# Patient Record
Sex: MALE | Race: BLACK OR AFRICAN AMERICAN | Employment: UNEMPLOYED | URBAN - METROPOLITAN AREA
[De-identification: names, ages, dates, MRNs, and addresses within clinical notes are randomized per-mention and may not be internally consistent; named-entity substitution may affect disease eponyms.]

---

## 2017-12-04 ENCOUNTER — OFFICE VISIT (OUTPATIENT)
Dept: CARDIOLOGY CLINIC | Age: 62
End: 2017-12-04

## 2017-12-04 VITALS
RESPIRATION RATE: 16 BRPM | SYSTOLIC BLOOD PRESSURE: 110 MMHG | DIASTOLIC BLOOD PRESSURE: 68 MMHG | HEIGHT: 66 IN | HEART RATE: 84 BPM | BODY MASS INDEX: 43.71 KG/M2 | OXYGEN SATURATION: 97 % | WEIGHT: 272 LBS

## 2017-12-04 DIAGNOSIS — I10 ESSENTIAL HYPERTENSION: ICD-10-CM

## 2017-12-04 DIAGNOSIS — I25.118 CORONARY ARTERY DISEASE INVOLVING NATIVE HEART WITH OTHER FORM OF ANGINA PECTORIS, UNSPECIFIED VESSEL OR LESION TYPE (HCC): ICD-10-CM

## 2017-12-04 DIAGNOSIS — R07.9 CHEST PAIN, UNSPECIFIED TYPE: Primary | ICD-10-CM

## 2017-12-04 RX ORDER — METOPROLOL SUCCINATE 25 MG/1
25 TABLET, EXTENDED RELEASE ORAL
Qty: 30 TAB | Refills: 1 | Status: SHIPPED | OUTPATIENT
Start: 2017-12-04 | End: 2020-12-23 | Stop reason: DRUGHIGH

## 2017-12-04 RX ORDER — GLIPIZIDE 5 MG/1
TABLET ORAL
Refills: 0 | COMMUNITY
Start: 2017-10-29 | End: 2018-06-18

## 2017-12-04 RX ORDER — ALLOPURINOL 100 MG/1
TABLET ORAL
Refills: 0 | COMMUNITY
Start: 2017-09-13

## 2017-12-04 RX ORDER — NITROGLYCERIN 0.4 MG/1
0.4 TABLET SUBLINGUAL
Qty: 1 BOTTLE | Refills: 1 | Status: SHIPPED | OUTPATIENT
Start: 2017-12-04

## 2017-12-04 NOTE — PROGRESS NOTES
Faisal Sy DNP, ANP-BC  Subjective/HPI:     Lulú Arroyo is a 58 y.o. male is here for new patient consultation regarding episodes of anterior chest fullness requiring the use of sublingual nitroglycerin in October, progressive dyspnea on exertion and fatigue. Mr. Gerard Nguyen has a history of atherosclerotic heart disease with remote PTCA and stenting approximately 1520 years ago at [facility]. He also has a history of diabetes, hypertension hyperlipidemia and remains an active smoker. PCP Provider  Kay Hoff NP  Past Medical History:   Diagnosis Date    CAD (coronary artery disease)     Diabetes ([address]     Hypertension       Past Surgical History:   Procedure Laterality Date    CARDIAC SURG PROCEDURE UNLIST      cardiac stents x2     No Known Allergies   History reviewed. No pertinent family history. Current Outpatient Prescriptions   Medication Sig    glipiZIDE (GLUCOTROL) 5 mg tablet     metoprolol succinate (TOPROL-XL) 25 mg XL tablet Take 1 Tab by mouth nightly.  clopidogrel (PLAVIX) 75 mg tablet Take 75 mg by mouth daily.  atorvastatin (LIPITOR) 40 mg tablet Take 40 mg by mouth daily.  sildenafil citrate (VIAGRA) 100 mg tablet Take 100 mg by mouth as needed.  metFORMIN (GLUCOPHAGE) 1,000 mg tablet Take 1,000 mg by mouth two (2) times daily (with meals).  furosemide (LASIX) 40 mg tablet Take 40 mg by mouth daily.  acetaminophen-codeine (TYLENOL-CODEINE #3) 300-30 mg per tablet Take 1 Tab by mouth every four (4) hours as needed for Pain.  nitroglycerin (NITROSTAT) 0.4 mg SL tablet 0.4 mg by SubLINGual route every five (5) minutes as needed for Chest Pain.  lisinopril (PRINIVIL, ZESTRIL) 20 mg tablet Take 20 mg by mouth daily.  aspirin 81 mg chewable tablet Take 81 mg by mouth daily.  potassium chloride SR (KLOR-CON 10) 10 mEq tablet Take 20 mEq by mouth two (2) times a day.     allopurinol (ZYLOPRIM) 100 mg tablet take 1 tablet by mouth twice a day    HYDROcodone-acetaminophen (NORCO) 5-325 mg per tablet Take 1 Tab by mouth every four (4) hours as needed for Pain. Max Daily Amount: 6 Tabs.  predniSONE (STERAPRED) 5 mg dose pack See administration instruction per 5mg dose pack    ciprofloxacin (CIPRO) 500 mg tablet Take 500 mg by mouth two (2) times a day.  methocarbamol (ROBAXIN-750) 750 mg tablet Take 750 mg by mouth three (3) times daily.  oxybutynin (DITROPAN) 5 mg tablet Take 5 mg by mouth three (3) times daily. No current facility-administered medications for this visit. Vitals:    12/04/17 1329 12/04/17 1334   BP: 100/76 110/68   Pulse: 84    Resp: 16    SpO2: 97%    Weight: 272 lb (123.4 kg)    Height: 5' 6\" (1.676 m)      Social History     Social History    Marital status: SINGLE     Spouse name: N/A    Number of children: N/A    Years of education: N/A     Occupational History    Not on file. Social History Main Topics    Smoking status: Current Every Day Smoker     Packs/day: 1.50    Smokeless tobacco: Never Used    Alcohol use No    Drug use: No    Sexual activity: Not on file     Other Topics Concern    Not on file     Social History Narrative       I have reviewed the nurses notes, vitals, problem list, allergy list, medical history, family, social history and medications. Review of Symptoms:    General: Pt denies excessive weight gain or loss. Pt is able to conduct ADL's  HEENT: Denies blurred vision, headaches, epistaxis and difficulty swallowing. Respiratory: Denies shortness of breath, + HEDRICK, wheezing or stridor. Cardiovascular: + Chest pain, denies palpitations, edema or PND  Gastrointestinal: Denies poor appetite, indigestion, abdominal pain or blood in stool  Musculoskeletal: Denies pain or swelling from muscles or joints  Neurologic: Denies tremor, paresthesias, or sensory motor disturbance  Skin: Denies rash, itching or texture change.       Physical Exam:      General: Well developed, in no acute distress, cooperative and alert  HEENT: No carotid bruits, no JVD, trach is midline. Neck Supple, PEERL, EOM intact. Heart:  Normal S1/S2 negative S3 or S4. Regular, no murmur, gallop or rub.   Respiratory: Clear bilaterally x 4, no wheezing or rales  Abdomen:   Soft, non-tender, no masses, bowel sounds are active.   Extremities:  No edema, normal cap refill, no cyanosis, atraumatic. Neuro: A&Ox3, speech clear, gait stable. Skin: Skin color is normal. No rashes or lesions. Non diaphoretic  Vascular: 2+ pulses symmetric in all extremities    Cardiographics    ECG: Sinus  Results for orders placed or performed during the hospital encounter of 01/15/14   EKG, 12 LEAD, INITIAL   Result Value Ref Range    Ventricular Rate 57 BPM    Atrial Rate 57 BPM    P-R Interval 154 ms    QRS Duration 100 ms    Q-T Interval 420 ms    QTC Calculation (Bezet) 408 ms    Calculated P Axis 73 degrees    Calculated R Axis 64 degrees    Calculated T Axis 66 degrees    Diagnosis       Sinus bradycardia  No previous ECGs available  Nonspecific ST segment changes  Confirmed by Aide Brown ([id number]) on 1/15/2014 9:17:18 AM         Cardiology Labs:  No results found for: CHOL, CHOLX, CHLST, CHOLV, 885174, HDL, LDL, LDLC, DLDLP, TGLX, TRIGL, TRIGP, CHHD, [facility]    Lab Results   Component Value Date/Time    Sodium 137 01/15/2014 12:50 AM    Potassium 3.6 01/15/2014 12:50 AM    Chloride 102 01/15/2014 12:50 AM    CO2 27 01/15/2014 12:50 AM    Anion gap 8 01/15/2014 12:50 AM    Glucose 88 01/15/2014 12:50 AM    BUN 14 01/15/2014 12:50 AM    Creatinine 0.80 01/15/2014 12:50 AM    BUN/Creatinine ratio 18 01/15/2014 12:50 AM    GFR est AA >60 01/15/2014 12:50 AM    GFR est non-AA >60 01/15/2014 12:50 AM    Calcium 9.1 01/15/2014 12:50 AM    Bilirubin, total 0.4 01/15/2014 12:50 AM    AST (SGOT) 19 01/15/2014 12:50 AM    Alk.  phosphatase 74 01/15/2014 12:50 AM    Protein, total 7.3 01/15/2014 12:50 AM    Albumin 3.7 01/15/2014 12:50 AM    Globulin 3.6 01/15/2014 12:50 AM    A-G Ratio 1.0 01/15/2014 12:50 AM    ALT (SGPT) 24 01/15/2014 12:50 AM           Assessment:     Assessment:     Diagnoses and all orders for this visit:    1. Chest pain, unspecified type  -     AMB POC EKG ROUTINE W/ 12 LEADS, INTER & REP  -     2D ECHO COMPLETE ADULT (TTE) W OR WO CONTR; Future  -     LEXISCAN/CARDIOLITE, Clinic Performed; Future    2. Essential hypertension  -     2D ECHO COMPLETE ADULT (TTE) W OR WO CONTR; Future  -     LEXISCAN/CARDIOLITE, Clinic Performed; Future    3. Coronary artery disease involving native heart with other form of angina pectoris, unspecified vessel or lesion type ([facility]  -     2D ECHO COMPLETE ADULT (TTE) W OR WO CONTR; Future  -     LEXISCAN/CARDIOLITE, Clinic Performed; Future    Other orders  -     metoprolol succinate (TOPROL-XL) 25 mg XL tablet; Take 1 Tab by mouth nightly. ICD-10-CM ICD-9-CM    1. Chest pain, unspecified type R07.9 786.50 AMB POC EKG ROUTINE W/ 12 LEADS, INTER & REP      2D ECHO COMPLETE ADULT (TTE) W OR WO CONTR      STRESS TEST LEXISCAN/CARDIOLITE   2. Essential hypertension I10 401.9 2D ECHO COMPLETE ADULT (TTE) W OR WO CONTR      STRESS TEST LEXISCAN/CARDIOLITE   3.  Coronary artery disease involving native heart with other form of angina pectoris, unspecified vessel or lesion type ([facility] I25.118 414.01 2D ECHO COMPLETE ADULT (TTE) W OR WO CONTR     413.9 STRESS TEST LEXISCAN/CARDIOLITE     Orders Placed This Encounter    AMB POC EKG ROUTINE W/ 12 LEADS, INTER & REP     Order Specific Question:   Reason for Exam:     Answer:   routine    LEXISCAN/CARDIOLITE, Clinic Performed     Standing Status:   Future     Standing Expiration Date:   6/4/2018     Order Specific Question:   Reason for Exam:     Answer:   chest pain    2D ECHO COMPLETE ADULT (TTE) W OR WO CONTR     Standing Status:   Future     Standing Expiration Date:   6/4/2018     Order Specific Question:   Reason for Exam:     Answer:   chest pain     Order Specific Question:   Contrast Enhancement (Bubble Study, Definity, Optison) may be used if criteria listed in established evidence-based protocol has been identified. Answer: Yes    allopurinol (ZYLOPRIM) 100 mg tablet     Sig: take 1 tablet by mouth twice a day     Refill:  0    glipiZIDE (GLUCOTROL) 5 mg tablet     Refill:  0    metoprolol succinate (TOPROL-XL) 25 mg XL tablet     Sig: Take 1 Tab by mouth nightly. Dispense:  30 Tab     Refill:  1        Plan:     Patient is a 60-year-old male presenting with episodes of anterior chest discomfort and dyspnea on exertion, has a history of PTCA and stenting approximately 20 years ago has comorbidity risk factors of hypertension, hyperlipidemia, type 2 diabetes and is an active smoker. Will start beta-blocker 25 mg metoprolol nightly, continue aspirin and renew his sublingual nitroglycerin with an understanding he is not to use sublingual nitroglycerin within 24 hours of Viagra use. Will evaluate with Lexiscan and echocardiogram.  Follow-up when testing complete      Aiyana Kelly NP    This note was created using voice recognition software. Despite editing, there may be syntax errors. [facility]    12/5/2017         Agree with note as outlined by  NP. I confirm findings in history and physical exam. No additional findings noted. Agree with plan as outlined above.      [address] Kaylee Choudhary MD

## 2017-12-04 NOTE — PROGRESS NOTES
Chief Complaint   Patient presents with    Chest Pain (Angina)     pt c/o sob with activity, chest pain for 3 months( taking nitro [id number] since), swelling leg bilateral.     1. Have you been to the ER, urgent care clinic since your last visit? Hospitalized since your last visit? No    2. Have you seen or consulted any other health care providers outside of the [address] [place] [address] since your last visit? Include any pap smears or colon screening.  No

## 2017-12-05 PROBLEM — E66.01 OBESITY, MORBID (HCC): Status: ACTIVE | Noted: 2017-12-05

## 2017-12-08 ENCOUNTER — HOSPITAL ENCOUNTER (EMERGENCY)
Age: 62
Discharge: HOME | End: 2017-12-08
Attending: EMERGENCY MEDICINE
Payer: MEDICARE

## 2017-12-08 VITALS
RESPIRATION RATE: 16 BRPM | TEMPERATURE: 98.4 F | SYSTOLIC BLOOD PRESSURE: 107 MMHG | HEIGHT: 66 IN | HEART RATE: 78 BPM | DIASTOLIC BLOOD PRESSURE: 63 MMHG | BODY MASS INDEX: 43.71 KG/M2 | WEIGHT: 272 LBS

## 2017-12-08 DIAGNOSIS — Z23 NEED FOR TETANUS BOOSTER: ICD-10-CM

## 2017-12-08 DIAGNOSIS — B35.3 TINEA PEDIS OF RIGHT FOOT: ICD-10-CM

## 2017-12-08 DIAGNOSIS — S91.311A LACERATION OF RIGHT FOOT, INITIAL ENCOUNTER: Primary | ICD-10-CM

## 2017-12-08 PROCEDURE — 90715 TDAP VACCINE 7 YRS/> IM: CPT | Performed by: PHYSICIAN ASSISTANT

## 2017-12-08 PROCEDURE — 90471 IMMUNIZATION ADMIN: CPT

## 2017-12-08 PROCEDURE — 99283 EMERGENCY DEPT VISIT LOW MDM: CPT

## 2017-12-08 PROCEDURE — 74011250636 HC RX REV CODE- 250/636: Performed by: PHYSICIAN ASSISTANT

## 2017-12-08 RX ORDER — MICONAZOLE NITRATE 2 %
POWDER (GRAM) TOPICAL 2 TIMES DAILY
Qty: 85 G | Refills: 0 | Status: SHIPPED | OUTPATIENT
Start: 2017-12-08

## 2017-12-08 RX ORDER — MUPIROCIN 20 MG/G
OINTMENT TOPICAL 3 TIMES DAILY
Qty: 22 G | Refills: 0 | Status: SHIPPED | OUTPATIENT
Start: 2017-12-08

## 2017-12-08 RX ADMIN — TETANUS TOXOID, REDUCED DIPHTHERIA TOXOID AND ACELLULAR PERTUSSIS VACCINE, ADSORBED 0.5 ML: 5; 2.5; 8; 8; 2.5 SUSPENSION INTRAMUSCULAR at 19:16

## 2017-12-08 NOTE — ED NOTES
Emergency Department Nursing Plan of Care       The Nursing Plan of Care is developed from the Nursing assessment and Emergency Department Attending provider initial evaluation. The plan of care may be reviewed in the ED Provider note.     The Plan of Care was developed with the following considerations:   Patient / Family readiness to learn indicated by:verbalized understanding  Persons(s) to be included in education: patient  Barriers to Learning/Limitations:No    [facility]    12/8/2017   6:40 PM

## 2017-12-08 NOTE — ED TRIAGE NOTES
Patient here with c/o cut to right foot. Patient states because of hx of diabetes he was told by PCP to come in if any bleeding. Patient states \"I had a hard time patching it up, it wouldn't stop bleeding, and I thought I could go to sleep tonight but got afraid of it might bleed out. \"  Patient denies pain. Patient states cut happened as he was scratching foot with a butter knife.

## 2017-12-08 NOTE — ED PROVIDER NOTES
Patient is a 58 y.o. male presenting with skin laceration. Laceration         To ED with laceration to foot. Pt notes that his right foot had a slow healing wound (several months, took ? Abx) that has been closed several months. It has continued to be itchy and this evening he scratched it with a clean butter knife and noted it started to bleed. This occurred about two hours ago. Has since stopped bleeding, but his sister strongly urged he come in for evaluation and he notes being worried it might bleed again. He takes plavix. Past Medical History:   Diagnosis Date    CAD (coronary artery disease)     Diabetes ([facility]     Hypertension        Past Surgical History:   Procedure Laterality Date    CARDIAC SURG PROCEDURE UNLIST      cardiac stents x2         History reviewed. No pertinent family history. Social History     Social History    Marital status: SINGLE     Spouse name: N/A    Number of children: N/A    Years of education: N/A     Occupational History    Not on file. Social History Main Topics    Smoking status: Current Every Day Smoker     Packs/day: 1.50    Smokeless tobacco: Never Used    Alcohol use No    Drug use: No    Sexual activity: Not on file     Other Topics Concern    Not on file     Social History Narrative         ALLERGIES: Review of patient's allergies indicates no known allergies. Review of Systems   Constitutional: Negative for chills and fever. HENT: Negative for congestion, rhinorrhea and sore throat. Respiratory: Negative for cough and shortness of breath. Cardiovascular: Negative for chest pain. Gastrointestinal: Negative for abdominal pain, nausea and vomiting. Genitourinary: Negative for dysuria, frequency and urgency. Musculoskeletal: Negative for back pain and neck pain. Skin: Positive for wound. Negative for rash. R foot   Neurological: Negative for seizures and syncope. Psychiatric/Behavioral: Negative for confusion.  The patient is not nervous/anxious. All other systems reviewed and are negative. Vitals:    12/08/17 1813   BP: 107/63   Pulse: 78   Resp: 16   Temp: 98.4 °F (36.9 °C)   Weight: 123.4 kg (272 lb)   Height: 5' 6\" (1.676 m)            Physical Exam   Constitutional: He appears well-developed and well-nourished. No distress. Cardiovascular: Normal rate. No murmur heard. Pulmonary/Chest: Effort normal. He has no rales. Abdominal: Soft. There is no tenderness. There is no rebound. Musculoskeletal: Normal range of motion. R foot:   Pedal pulses palpable. Ankle DF/PF  Sensation intact. Foot with dry skin throughout. Nails thickened, scaling. R medial foot with 3/4 very shallow laceration. No bleeding. Site of prior healed would visible - skin closed. No erythema. No fluctuance. Skin: He is not diaphoretic. Nursing note and vitals reviewed. MDM  Number of Diagnoses or Management Options  Laceration of right foot, initial encounter:   Need for tetanus booster:   Tinea pedis of right foot:   Diagnosis management comments: DDX:     It appears that he has itchy feet. Possibly r/t what appears to be tinea. Possibly r/t healed wound. At any rate, he has disrupted the skin today. Bleeding is controlled and no signs of infection. Treat locally with bactroban and lotrimin powder to remaining foot. Reviewed s/sx for which he should return. He agrees to this plan of care. ED Course       Procedures           LABORATORY TESTS:  No results found for this or any previous visit (from the past 12 hour(s)). IMAGING RESULTS:  No orders to display       MEDICATIONS GIVEN:  Medications   diph,Pertuss(AC),Tet Vac-PF (BOOSTRIX) suspension 0.5 mL (0.5 mL IntraMUSCular Given 12/8/17 1916)       IMPRESSION:  1. Laceration of right foot, initial encounter    2. Tinea pedis of right foot    3. Need for tetanus booster        PLAN:  1.    Current Discharge Medication List      START taking these medications Details   mupirocin (BACTROBAN) 2 % ointment Apply  to affected area three (3) times daily. Apply to foot laceration for 5 days  Qty: 22 g, Refills: 0      miconazole (LOTRIMIN AF) 2 % topical powder Apply  to affected area two (2) times a day. Qty: 85 g, Refills: 0           2. Follow-up Information     Follow up With Details Comments [id number] Iban Martinez, NP  Follow up for continued care.   [address] [facility] EMERGENCY DEPT  If symptoms worsen - fevers, pus from wound, fevers, redness to site [facility]  [phone]        Return to ED if worse

## 2017-12-09 NOTE — ED NOTES
Verbal shift change report given to Denia Antonio RN (oncoming nurse) by Lanny Fenton RN (offgoing nurse). Report included the following information SBAR and ED Summary.

## 2017-12-09 NOTE — ED NOTES
KIAN Castle discharged pt and provided pt w/ after care instructions, no pain reassessment given to RN.

## 2017-12-09 NOTE — DISCHARGE INSTRUCTIONS
Athlete's Foot: Care Instructions  Your Care Instructions    Athlete's foot is an itchy rash on the foot caused by an infection with a fungus. You can get it by going barefoot in wet public areas, such as swimming pools or locker rooms. Many times there is no clear reason why you get athlete's foot. You can easily treat athlete's foot by putting medicine on your feet for 1 to 6 weeks. In some cases, a doctor may prescribe pills to kill the fungus. Follow-up care is a key part of your treatment and safety. Be sure to make and go to all appointments, and call your doctor if you are having problems. It's also a good idea to know your test results and keep a list of the medicines you take. How can you care for yourself at home? · Your doctor may suggest an over-the counter lotion or spray or may prescribe a medicine. Take your medicines exactly as prescribed. Call your doctor if you think you are having a problem with your medicine. · Keep your feet clean and dry. · When you get dressed, put your socks on before your underwear. This can prevent the fungus from spreading from your feet to your groin. To prevent athlete's foot  · Wear flip-flops or other shower sandals in public locker rooms and showers and by the pool. · Dry between your toes after swimming or bathing. · Wear leather shoes or sandals, which let air get to your feet. · Change your socks as needed so your feet stay as dry as possible. · Use antifungal powder on your feet. When should you call for help? Watch closely for changes in your health, and be sure to contact your doctor if:  · You do not get better as expected. Where can you learn more? Go to http://daphne-ruddy.info/. Enter M498 in the search box to learn more about \"Athlete's Foot: Care Instructions. \"  Current as of: October 13, 2016  Content Version: 11.4  © 8947-3009 [employer].  Care instructions adapted under license by [facility] (which disclaims liability or warranty for this information). If you have questions about a medical condition or this instruction, always ask your healthcare professional. [address] any warranty or liability for your use of this information. Cuts Left Open: Care Instructions  Your Care Instructions    A cut can happen anywhere on your body. Sometimes a cut can injure the tendons, blood vessels, or nerves. A cut may be left open instead of being closed with stitches, staples, or adhesive. A cut may be left open when it is likely to become infected, because closing it can make infection even more likely. You will probably have a bandage. The doctor may want the cut to stay open the whole time it heals. This happens with some cuts when too much time has gone by since the cut happened. Or the doctor may tell you to come back to have the cut closed in 4 to 5 days, when there is less chance of infection. If the cut stays open while healing, your scar may be larger than if the cut was closed. But you can get treatment later to make the scar smaller. The doctor has checked you carefully, but problems can develop later. If you notice any problems or new symptoms, get medical treatment right away. Follow-up care is a key part of your treatment and safety. Be sure to make and go to all appointments, and call your doctor if you are having problems. It's also a good idea to know your test results and keep a list of the medicines you take. How can you care for yourself at home? · Keep the cut dry for the first 24 to 48 hours. After this, you can shower if your doctor okays it. Pat the cut dry. · Don't soak the cut, such as in a bathtub. Your doctor will tell you when it's safe to get the cut wet. · If your doctor told you how to care for your cut, follow your doctor's instructions.  If you did not get instructions, follow this general advice:  ¨ After the first 24 to 48 hours, wash the cut with clean water 2 times a day. Don't use hydrogen peroxide or alcohol, which can slow healing. ¨ You may cover the cut with a thin layer of petroleum jelly, such as Vaseline, and a nonstick bandage. ¨ Apply more petroleum jelly and replace the bandage as needed. · Prop up the injured area on a pillow anytime you sit or lie down during the next 3 days. Try to keep it above the level of your heart. This will help reduce swelling. · Avoid any activity that could cause your cut to get worse. · Take pain medicines exactly as directed. ¨ If the doctor gave you a prescription medicine for pain, take it as prescribed. ¨ If you are not taking a prescription pain medicine, ask your doctor if you can take an over-the-counter medicine. When should you call for help? Call your doctor now or seek immediate medical care if:  ? · You have new pain, or your pain gets worse. ? · The cut starts to bleed, and blood soaks through the bandage. Oozing small amounts of blood is normal.   ? · The skin near the cut is cold or pale or changes color. ? · You have tingling, weakness, or numbness near the cut.   ? · You have trouble moving the area near the cut.   ? · You have symptoms of infection, such as:  ¨ Increased pain, swelling, warmth, or redness around the cut. ¨ Red streaks leading from the cut. ¨ Pus draining from the cut. ¨ A fever. ? Watch closely for changes in your health, and be sure to contact your doctor if:  ? · The cut is not closing (getting smaller). ? · You do not get better as expected. Where can you learn more? Go to http://daphne-ruddy.info/. Enter 20-23-41-52 in the search box to learn more about \"Cuts Left Open: Care Instructions. \"  Current as of: March 20, 2017  Content Version: 11.4  © 5783-3166 [employer]. Care instructions adapted under license by [employer] (which disclaims liability or warranty for this information).  If you have questions about a medical condition or this instruction, always ask your healthcare professional. [place] [address] any warranty or liability for your use of this information.

## 2017-12-27 ENCOUNTER — CLINICAL SUPPORT (OUTPATIENT)
Dept: CARDIOLOGY CLINIC | Age: 62
End: 2017-12-27

## 2017-12-27 DIAGNOSIS — R07.9 CHEST PAIN, UNSPECIFIED TYPE: ICD-10-CM

## 2017-12-27 DIAGNOSIS — I10 ESSENTIAL HYPERTENSION: ICD-10-CM

## 2017-12-27 DIAGNOSIS — I25.118 CORONARY ARTERY DISEASE INVOLVING NATIVE HEART WITH OTHER FORM OF ANGINA PECTORIS, UNSPECIFIED VESSEL OR LESION TYPE (HCC): ICD-10-CM

## 2017-12-27 DIAGNOSIS — R07.9 CHEST PAIN, UNSPECIFIED TYPE: Primary | ICD-10-CM

## 2017-12-29 ENCOUNTER — CLINICAL SUPPORT (OUTPATIENT)
Dept: CARDIOLOGY CLINIC | Age: 62
End: 2017-12-29

## 2017-12-29 DIAGNOSIS — R07.9 CHEST PAIN, UNSPECIFIED TYPE: ICD-10-CM

## 2017-12-29 DIAGNOSIS — I10 ESSENTIAL HYPERTENSION: ICD-10-CM

## 2017-12-29 DIAGNOSIS — I25.118 CORONARY ARTERY DISEASE INVOLVING NATIVE HEART WITH OTHER FORM OF ANGINA PECTORIS, UNSPECIFIED VESSEL OR LESION TYPE (HCC): ICD-10-CM

## 2017-12-29 DIAGNOSIS — R93.1 ABNORMAL NUCLEAR CARDIAC IMAGING TEST: Primary | ICD-10-CM

## 2018-01-02 NOTE — PROGRESS NOTES
[address]    [place] [address]    Phone:  [phone]                                       After Visit Summary   10/20/2017    Nicolasa Moore    MRN: [id number]           After Visit Summary Signature Page     I have received my discharge instructions, and my questions have been answered. I have discussed any challenges I see with this plan with the nurse or doctor.    ..........................................................................................................................................  Patient/Patient Representative Signature      ..........................................................................................................................................  Patient Representative Print Name and Relationship to Patient    ..................................................               ................................................  Date                                            Time    ..........................................................................................................................................  Reviewed by Signature/Title    ...................................................              ..............................................  Date                                                            Time           Stress test abnormal, f/u to discuss.  thx.

## 2018-01-03 NOTE — PROGRESS NOTES
Verified patient with two identifiers. Spoke with patient regarding STRESS test results. Xenia Bosch, pt will need a follow up apt with Dr. Yunior Romero.   Thanks!!

## 2018-01-18 ENCOUNTER — OFFICE VISIT (OUTPATIENT)
Dept: CARDIOLOGY CLINIC | Age: 63
End: 2018-01-18

## 2018-01-18 VITALS
SYSTOLIC BLOOD PRESSURE: 122 MMHG | DIASTOLIC BLOOD PRESSURE: 60 MMHG | OXYGEN SATURATION: 92 % | WEIGHT: 272.6 LBS | BODY MASS INDEX: 43.81 KG/M2 | HEIGHT: 66 IN | HEART RATE: 72 BPM | RESPIRATION RATE: 20 BRPM

## 2018-01-18 DIAGNOSIS — I20.9 ANGINA, CLASS III (HCC): Primary | ICD-10-CM

## 2018-01-18 DIAGNOSIS — I25.10 ASHD (ARTERIOSCLEROTIC HEART DISEASE): ICD-10-CM

## 2018-01-18 DIAGNOSIS — R94.39 ABNORMAL STRESS TEST: ICD-10-CM

## 2018-01-18 DIAGNOSIS — R94.39 ABNORMAL STRESS ECHO: ICD-10-CM

## 2018-01-18 NOTE — MR AVS SNAPSHOT
Omar Mast [address] [place] [address] 
[phone] Patient: Damaris Cortez MRN: [id number] NRO:6/58/1479 Visit Information Date & Time Provider Department Dept. Phone Encounter #  
 1/18/2018  1:30 PM Dayanara Greene, [address] [place] [address] [facility] [phone] [id number] Upcoming Health Maintenance Date Due Hepatitis C Screening 1955 Pneumococcal 19-64 Medium Risk (1 of 1 - PPSV23) 6/24/1974 FOBT Q 1 YEAR AGE 50-75 6/24/2005 ZOSTER VACCINE AGE 60> 4/24/2015 Influenza Age 5 to Adult 8/1/2017 DTaP/Tdap/Td series (2 - Td) 12/8/2027 Allergies as of 1/18/2018  Review Complete On: 1/18/2018 By: Traci Kingston LPN No Known Allergies Current Immunizations  Never Reviewed Name Date Tdap 12/8/2017  7:16 PM  
  
 Not reviewed this visit You Were Diagnosed With   
  
 Codes Comments Abnormal stress echo    -  Primary ICD-10-CM: R94.39 
ICD-9-CM: 794.39 Angina, class III ([facility] [address]     ICD-10-CM: I20.9 ICD-9-CM: 413.9 Abnormal stress test     ICD-10-CM: R94.39 
ICD-9-CM: 794.39 Vitals BP Pulse Resp Height(growth percentile) Weight(growth percentile) SpO2  
 122/60 (BP 1 Location: Right arm, BP Patient Position: Sitting) 72 20 5' 6\" (1.676 m) 272 lb 9.6 oz (123.7 kg) 92% BMI Smoking Status 44 kg/m2 Current Every Day Smoker Vitals History BMI and BSA Data Body Mass Index Body Surface Area 44 kg/m 2 2.4 m 2 Preferred Pharmacy Pharmacy Name Phone [phone] [phone] Leslie Garcia [phone] Your Updated Medication List  
  
   
This list is accurate as of: 1/18/18  2:25 PM.  Always use your most recent med list.  
  
  
  
  
 allopurinol 100 mg tablet Commonly known as:  ZYLOPRIM  
take 1 tablet by mouth twice a day  
  
 aspirin 81 mg chewable tablet Take 81 mg by mouth daily. atorvastatin 40 mg tablet Commonly known as:  LIPITOR Take 40 mg by mouth daily. furosemide 40 mg tablet Commonly known as:  LASIX Take 40 mg by mouth daily. glipiZIDE 5 mg tablet Commonly known as:  Noemí Allen HYDROcodone-acetaminophen 5-325 mg per tablet Commonly known as:  Elena Pandya Take 1 Tab by mouth every four (4) hours as needed for Pain. Max Daily Amount: 6 Tabs. lisinopril 20 mg tablet Commonly known as:  Abelardo Rosa Take 20 mg by mouth daily. metFORMIN 1,000 mg tablet Commonly known as:  GLUCOPHAGE Take 1,000 mg by mouth two (2) times daily (with meals). metoprolol succinate 25 mg XL tablet Commonly known as:  TOPROL-XL Take 1 Tab by mouth nightly. miconazole 2 % topical powder Commonly known as:  LOTRIMIN AF Apply  to affected area two (2) times a day. mupirocin 2 % ointment Commonly known as:  [employer] Apply  to affected area three (3) times daily. Apply to foot laceration for 5 days * nitroglycerin 0.4 mg SL tablet Commonly known as:  NITROSTAT  
0.4 mg by SubLINGual route every five (5) minutes as needed for Chest Pain. * nitroglycerin 0.4 mg SL tablet Commonly known as:  NITROSTAT  
1 Tab by SubLINGual route every five (5) minutes as needed for Chest Pain. Do not use with in 24 hours of Viagra use. oxybutynin 5 mg tablet Commonly known as:  TIBCUNIO Take 5 mg by mouth three (3) times daily. PLAVIX 75 mg Tab Generic drug:  clopidogrel Take 75 mg by mouth daily. potassium chloride SR 10 mEq tablet Commonly known as:  KLOR-CON 10 Take 20 mEq by mouth two (2) times a day. ROBAXIN-750 750 mg tablet Generic drug:  methocarbamol Take 750 mg by mouth three (3) times daily. sildenafil citrate 100 mg tablet Commonly known as:  VIAGRA Take 100 mg by mouth as needed. TYLENOL-CODEINE #3 300-30 mg per tablet Generic drug:  acetaminophen-codeine Take 1 Tab by mouth every four (4) hours as needed for Pain. * Notice: This list has 2 medication(s) that are the same as other medications prescribed for you. Read the directions carefully, and ask your doctor or other care provider to review them with you. We Performed the Following CBC W/O DIFF [23296 CPT(R)] METABOLIC PANEL, BASIC [43733 CPT(R)] PROTHROMBIN TIME + INR [62771 CPT(R)] Introducing [place]! Nicki Shin introduces [employer] patient portal. Now you can access parts of your medical record, email your doctor's office, and request medication refills online. 1. In your internet browser, go to [email or web address] 2. Click on the First Time User? Click Here link in the Sign In box. You will see the New Member Sign Up page. 3. Enter your [employer] Access Code exactly as it appears below. You will not need to use this code after youve completed the sign-up process. If you do not sign up before the expiration date, you must request a new code. · [employer] Access Code: [email or web address] Expires: 3/4/2018  1:21 PM 
 
4. Enter the last four digits of your Social Security Number (xxxx) and Date of Birth (mm/dd/yyyy) as indicated and click Submit. You will be taken to the next sign-up page. 5. Create a [employer] ID. This will be your [employer] login ID and cannot be changed, so think of one that is secure and easy to remember. 6. Create a [employer] password. You can change your password at any time. 7. Enter your Password Reset Question and Answer. This can be used at a later time if you forget your password. 8. Enter your e-mail address. You will receive e-mail notification when new information is available in [address] 9. Click Sign Up. You can now view and download portions of your medical record. 10. Click the Download Summary menu link to download a portable copy of your medical information. If you have questions, please visit the Frequently Asked Questions section of the [employer] website. Remember, [facility] is NOT to be used for urgent needs. For medical emergencies, dial 911. Now available from your iPhone and Android! Please provide this summary of care documentation to your next provider. Your primary care clinician is listed as Opal Arreguin. If you have any questions after today's visit, please call [phone].

## 2018-01-18 NOTE — PROGRESS NOTES
NAME:  Linda Jurado   :   1955   MRN:   [id number]   PCP:  Ab Apodaca NP           Subjective: The patient is a 58y.o. year old male  who returns for a routine follow-up. Since the last visit, patient reports no change in exercise tolerance,  edema, medication intolerance, palpitations, shortness of breath, PND/orthopnea wheezing, sputum, syncope, dizziness or light headedness. Continues to have chest pain. Past Medical History:   Diagnosis Date    CAD (coronary artery disease)     Diabetes ([facility] [address]     Hypertension         ICD-10-CM ICD-9-CM    1. Angina, class III (HCC) I20.9 413.9 CBC W/O DIFF      METABOLIC PANEL, BASIC      PROTHROMBIN TIME + INR      CARDIAC CATHETERIZATION   2. Abnormal stress echo R94.39 794.39 CANCELED: AMB POC EKG ROUTINE W/ 12 LEADS, INTER & REP   3. Abnormal stress test R94.39 794.39    4. ASHD (arteriosclerotic heart disease) I25.10 414.00       Social History   Substance Use Topics    Smoking status: Current Every Day Smoker     Packs/day: 1.50    Smokeless tobacco: Never Used    Alcohol use No      No family history on file. Review of Systems  General: Pt denies excessive weight gain or loss. Pt is able to conduct ADL's  HEENT: Denies blurred vision, headaches, epistaxis and difficulty swallowing. Respiratory: Denies shortness of breath, HEDRICK, wheezing or stridor. Cardiovascular: Denies precordial pain, palpitations, edema or PND  Gastrointestinal: Denies poor appetite, indigestion, abdominal pain or blood in stool  Musculoskeletal: Denies pain or swelling from muscles or joints  Neurologic: Denies tremor, paresthesias, or sensory motor disturbance  Skin: Denies rash, itching or texture change.     Objective:       Vitals:    18 1341 18 1350   BP: 120/60 122/60   Pulse: 72    Resp: 20    SpO2: 92%    Weight: 272 lb 9.6 oz (123.7 kg)    Height: 5' 6\" (1.676 m)     Body mass index is 44 kg/(m^2). General PE  Mental Status - Alert. General Appearance - Not in acute distress. Chest and Lung Exam   Inspection: Accessory muscles - No use of accessory muscles in breathing. Auscultation:   Breath sounds: - Normal.    Cardiovascular   Inspection: Jugular vein - Bilateral - Inspection Normal.  Palpation/Percussion:   Apical Impulse: - Normal.  Auscultation: Rhythm - Regular. Heart Sounds - S1 WNL and S2 WNL. No S3 or S4. Murmurs & Other Heart Sounds: Auscultation of the heart reveals - No Murmurs. Peripheral Vascular   Upper Extremity: Inspection - Bilateral - No Cyanotic nailbeds or Digital clubbing. Lower Extremity:   Palpation: Edema - Bilateral - No edema. Data Review:     EKG -EKG: normal EKG, normal sinus rhythm, unchanged from previous tracings. Medications reviewed  Current Outpatient Prescriptions   Medication Sig    mupirocin (BACTROBAN) 2 % ointment Apply  to affected area three (3) times daily. Apply to foot laceration for 5 days    miconazole (LOTRIMIN AF) 2 % topical powder Apply  to affected area two (2) times a day.  glipiZIDE (GLUCOTROL) 5 mg tablet     nitroglycerin (NITROSTAT) 0.4 mg SL tablet 1 Tab by SubLINGual route every five (5) minutes as needed for Chest Pain. Do not use with in 24 hours of Viagra use.  clopidogrel (PLAVIX) 75 mg tablet Take 75 mg by mouth daily.  atorvastatin (LIPITOR) 40 mg tablet Take 40 mg by mouth daily.  sildenafil citrate (VIAGRA) 100 mg tablet Take 100 mg by mouth as needed.  metFORMIN (GLUCOPHAGE) 1,000 mg tablet Take 1,000 mg by mouth two (2) times daily (with meals).  furosemide (LASIX) 40 mg tablet Take 40 mg by mouth daily.  nitroglycerin (NITROSTAT) 0.4 mg SL tablet 0.4 mg by SubLINGual route every five (5) minutes as needed for Chest Pain.  lisinopril (PRINIVIL, ZESTRIL) 20 mg tablet Take 20 mg by mouth daily.  aspirin 81 mg chewable tablet Take 81 mg by mouth daily.     potassium chloride SR (KLOR-CON 10) 10 mEq tablet Take 20 mEq by mouth two (2) times a day.  allopurinol (ZYLOPRIM) 100 mg tablet take 1 tablet by mouth twice a day    metoprolol succinate (TOPROL-XL) 25 mg XL tablet Take 1 Tab by mouth nightly.  HYDROcodone-acetaminophen (NORCO) 5-325 mg per tablet Take 1 Tab by mouth every four (4) hours as needed for Pain. Max Daily Amount: 6 Tabs.  methocarbamol (ROBAXIN-750) 750 mg tablet Take 750 mg by mouth three (3) times daily.  oxybutynin (DITROPAN) 5 mg tablet Take 5 mg by mouth three (3) times daily.  acetaminophen-codeine (TYLENOL-CODEINE #3) 300-30 mg per tablet Take 1 Tab by mouth every four (4) hours as needed for Pain. No current facility-administered medications for this visit. Assessment:       ICD-10-CM ICD-9-CM    1. Angina, class III (HCC) I20.9 413.9 CBC W/O DIFF      METABOLIC PANEL, BASIC      PROTHROMBIN TIME + INR      CARDIAC CATHETERIZATION   2. Abnormal stress echo R94.39 794.39 CANCELED: AMB POC EKG ROUTINE W/ 12 LEADS, INTER & REP   3. Abnormal stress test R94.39 794.39    4. ASHD (arteriosclerotic heart disease) I25.10 414.00         Plan:     Patient presents with continued chest pain. Has known CAD. Stress test shows significant ischemia. Discussed cath/PCI. Benefits, risks, alternatives discussed with patient and wife.      Emelia Daniels MD

## 2018-01-18 NOTE — PROGRESS NOTES
1. Have you been to the ER, urgent care clinic since your last visit? Hospitalized since your last visit? NO    2. Have you seen or consulted any other health care providers outside of the [address] [place] [address] since your last visit? Include any pap smears or colon screening. NO    NO CARDIAC C/O.

## 2018-01-21 PROBLEM — I25.10 ASHD (ARTERIOSCLEROTIC HEART DISEASE): Status: ACTIVE | Noted: 2018-01-21

## 2018-01-30 LAB
BUN SERPL-MCNC: 21 MG/DL (ref 8–27)
BUN/CREAT SERPL: 18 (ref 10–24)
CALCIUM SERPL-MCNC: 9.5 MG/DL (ref 8.6–10.2)
CHLORIDE SERPL-SCNC: 99 MMOL/L (ref 96–106)
CO2 SERPL-SCNC: 26 MMOL/L (ref 18–29)
CREAT SERPL-MCNC: 1.17 MG/DL (ref 0.76–1.27)
ERYTHROCYTE [DISTWIDTH] IN BLOOD BY AUTOMATED COUNT: 15.1 % (ref 12.3–15.4)
GFR SERPLBLD CREATININE-BSD FMLA CKD-EPI: 66 ML/MIN/1.73
GFR SERPLBLD CREATININE-BSD FMLA CKD-EPI: 77 ML/MIN/1.73
GLUCOSE SERPL-MCNC: 223 MG/DL (ref 65–99)
HCT VFR BLD AUTO: 45.1 % (ref 37.5–51)
HGB BLD-MCNC: 14.5 G/DL (ref 13–17.7)
INR PPP: 0.9 (ref 0.8–1.2)
MCH RBC QN AUTO: 27.2 PG (ref 26.6–33)
MCHC RBC AUTO-ENTMCNC: 32.2 G/DL (ref 31.5–35.7)
MCV RBC AUTO: 85 FL (ref 79–97)
PLATELET # BLD AUTO: 294 X10E3/UL (ref 150–379)
POTASSIUM SERPL-SCNC: 4.7 MMOL/L (ref 3.5–5.2)
PROTHROMBIN TIME: 9.9 SEC (ref 9.1–12)
RBC # BLD AUTO: 5.34 X10E6/UL (ref 4.14–5.8)
SODIUM SERPL-SCNC: 141 MMOL/L (ref 134–144)
WBC # BLD AUTO: 6 X10E3/UL (ref 3.4–10.8)

## 2018-02-01 ENCOUNTER — HOSPITAL ENCOUNTER (OUTPATIENT)
Dept: CARDIAC CATH/INVASIVE PROCEDURES | Age: 63
Discharge: HOME | End: 2018-02-01
Attending: INTERNAL MEDICINE | Admitting: INTERNAL MEDICINE
Payer: MEDICARE

## 2018-02-01 VITALS
SYSTOLIC BLOOD PRESSURE: 134 MMHG | HEIGHT: 66 IN | OXYGEN SATURATION: 89 % | HEART RATE: 57 BPM | TEMPERATURE: 97.4 F | DIASTOLIC BLOOD PRESSURE: 81 MMHG | BODY MASS INDEX: 43.55 KG/M2 | WEIGHT: 271 LBS | RESPIRATION RATE: 16 BRPM

## 2018-02-01 DIAGNOSIS — I20.9 ANGINA, CLASS III (HCC): ICD-10-CM

## 2018-02-01 LAB
GLUCOSE BLD STRIP.AUTO-MCNC: 164 MG/DL (ref 65–100)
GLUCOSE BLD STRIP.AUTO-MCNC: 167 MG/DL (ref 65–100)
SERVICE CMNT-IMP: ABNORMAL
SERVICE CMNT-IMP: ABNORMAL

## 2018-02-01 PROCEDURE — 77030019698 HC SYR ANGI MDLON MRTM -A

## 2018-02-01 PROCEDURE — 77030019569 HC BND COMPR RAD TERU -B

## 2018-02-01 PROCEDURE — 74011000250 HC RX REV CODE- 250

## 2018-02-01 PROCEDURE — 74011250636 HC RX REV CODE- 250/636: Performed by: INTERNAL MEDICINE

## 2018-02-01 PROCEDURE — 77030010221 HC SPLNT WR POS TELE -B

## 2018-02-01 PROCEDURE — 77030015766

## 2018-02-01 PROCEDURE — 77030028837 HC SYR ANGI PWR INJ COEU -A

## 2018-02-01 PROCEDURE — 74011250636 HC RX REV CODE- 250/636

## 2018-02-01 PROCEDURE — 74011636320 HC RX REV CODE- 636/320

## 2018-02-01 PROCEDURE — 77030008543 HC TBNG MON PRSS MRTM -A

## 2018-02-01 PROCEDURE — 77030004549 HC CATH ANGI DX PRF MRTM -A

## 2018-02-01 PROCEDURE — 99153 MOD SED SAME PHYS/QHP EA: CPT

## 2018-02-01 PROCEDURE — C1894 INTRO/SHEATH, NON-LASER: HCPCS

## 2018-02-01 PROCEDURE — C1769 GUIDE WIRE: HCPCS

## 2018-02-01 PROCEDURE — 82962 GLUCOSE BLOOD TEST: CPT

## 2018-02-01 RX ORDER — MIDAZOLAM HYDROCHLORIDE 1 MG/ML
.5-2 INJECTION, SOLUTION INTRAMUSCULAR; INTRAVENOUS
Status: DISCONTINUED | OUTPATIENT
Start: 2018-02-01 | End: 2018-02-01

## 2018-02-01 RX ORDER — HEPARIN SODIUM 200 [USP'U]/100ML
500 INJECTION, SOLUTION INTRAVENOUS ONCE
Status: COMPLETED | OUTPATIENT
Start: 2018-02-01 | End: 2018-02-01

## 2018-02-01 RX ORDER — VERAPAMIL HYDROCHLORIDE 2.5 MG/ML
INJECTION, SOLUTION INTRAVENOUS
Status: COMPLETED
Start: 2018-02-01 | End: 2018-02-01

## 2018-02-01 RX ORDER — HEPARIN SODIUM 1000 [USP'U]/ML
2500 INJECTION, SOLUTION INTRAVENOUS; SUBCUTANEOUS ONCE
Status: COMPLETED | OUTPATIENT
Start: 2018-02-01 | End: 2018-02-01

## 2018-02-01 RX ORDER — METFORMIN HYDROCHLORIDE 1000 MG/1
1000 TABLET ORAL 2 TIMES DAILY WITH MEALS
Qty: 60 TAB | Refills: 11 | Status: SHIPPED | OUTPATIENT
Start: 2018-02-01 | End: 2020-11-24

## 2018-02-01 RX ORDER — LIDOCAINE HYDROCHLORIDE 10 MG/ML
1-30 INJECTION, SOLUTION EPIDURAL; INFILTRATION; INTRACAUDAL; PERINEURAL
Status: DISCONTINUED | OUTPATIENT
Start: 2018-02-01 | End: 2018-02-01

## 2018-02-01 RX ORDER — HEPARIN SODIUM 1000 [USP'U]/ML
INJECTION, SOLUTION INTRAVENOUS; SUBCUTANEOUS
Status: COMPLETED
Start: 2018-02-01 | End: 2018-02-01

## 2018-02-01 RX ORDER — VERAPAMIL HYDROCHLORIDE 2.5 MG/ML
2.5 INJECTION, SOLUTION INTRAVENOUS ONCE
Status: COMPLETED | OUTPATIENT
Start: 2018-02-01 | End: 2018-02-01

## 2018-02-01 RX ORDER — FENTANYL CITRATE 50 UG/ML
25-50 INJECTION, SOLUTION INTRAMUSCULAR; INTRAVENOUS
Status: DISCONTINUED | OUTPATIENT
Start: 2018-02-01 | End: 2018-02-01

## 2018-02-01 RX ORDER — MIDAZOLAM HYDROCHLORIDE 1 MG/ML
INJECTION, SOLUTION INTRAMUSCULAR; INTRAVENOUS
Status: COMPLETED
Start: 2018-02-01 | End: 2018-02-01

## 2018-02-01 RX ORDER — FENTANYL CITRATE 50 UG/ML
INJECTION, SOLUTION INTRAMUSCULAR; INTRAVENOUS
Status: COMPLETED
Start: 2018-02-01 | End: 2018-02-01

## 2018-02-01 RX ORDER — LIDOCAINE HYDROCHLORIDE 10 MG/ML
INJECTION, SOLUTION EPIDURAL; INFILTRATION; INTRACAUDAL; PERINEURAL
Status: COMPLETED
Start: 2018-02-01 | End: 2018-02-01

## 2018-02-01 RX ORDER — HEPARIN SODIUM 1000 [USP'U]/ML
INJECTION, SOLUTION INTRAVENOUS; SUBCUTANEOUS
Status: DISCONTINUED
Start: 2018-02-01 | End: 2018-02-01 | Stop reason: HOSPADM

## 2018-02-01 RX ORDER — LIDOCAINE HYDROCHLORIDE 10 MG/ML
INJECTION, SOLUTION EPIDURAL; INFILTRATION; INTRACAUDAL; PERINEURAL
Status: DISCONTINUED
Start: 2018-02-01 | End: 2018-02-01 | Stop reason: HOSPADM

## 2018-02-01 RX ORDER — HEPARIN SODIUM 200 [USP'U]/100ML
INJECTION, SOLUTION INTRAVENOUS
Status: COMPLETED
Start: 2018-02-01 | End: 2018-02-01

## 2018-02-01 RX ORDER — VERAPAMIL HYDROCHLORIDE 2.5 MG/ML
INJECTION, SOLUTION INTRAVENOUS
Status: DISCONTINUED
Start: 2018-02-01 | End: 2018-02-01 | Stop reason: HOSPADM

## 2018-02-01 RX ADMIN — MIDAZOLAM 1 MG: 1 INJECTION INTRAMUSCULAR; INTRAVENOUS at 10:47

## 2018-02-01 RX ADMIN — HEPARIN SODIUM 2500 UNITS: 1000 INJECTION, SOLUTION INTRAVENOUS; SUBCUTANEOUS at 10:59

## 2018-02-01 RX ADMIN — IOPAMIDOL 18 ML: 755 INJECTION, SOLUTION INTRAVENOUS at 11:14

## 2018-02-01 RX ADMIN — NITROGLYCERIN 200 MCG: 5 INJECTION, SOLUTION INTRAVENOUS at 10:59

## 2018-02-01 RX ADMIN — LIDOCAINE HYDROCHLORIDE 1 ML: 10 INJECTION, SOLUTION EPIDURAL; INFILTRATION; INTRACAUDAL; PERINEURAL at 10:56

## 2018-02-01 RX ADMIN — FENTANYL CITRATE 50 MCG: 50 INJECTION, SOLUTION INTRAMUSCULAR; INTRAVENOUS at 10:47

## 2018-02-01 RX ADMIN — HEPARIN SODIUM 1000 UNITS: 200 INJECTION, SOLUTION INTRAVENOUS at 10:54

## 2018-02-01 RX ADMIN — Medication 1000 UNITS: at 10:54

## 2018-02-01 RX ADMIN — IOPAMIDOL 50 ML: 755 INJECTION, SOLUTION INTRAVENOUS at 11:16

## 2018-02-01 RX ADMIN — MIDAZOLAM HYDROCHLORIDE 1 MG: 1 INJECTION, SOLUTION INTRAMUSCULAR; INTRAVENOUS at 10:47

## 2018-02-01 RX ADMIN — VERAPAMIL HYDROCHLORIDE 2.5 MG: 2.5 INJECTION, SOLUTION INTRAVENOUS at 10:59

## 2018-02-01 RX ADMIN — VERAPAMIL HYDROCHLORIDE 2.5 MG: 2.5 INJECTION INTRAVENOUS at 10:59

## 2018-02-01 NOTE — PROGRESS NOTES
Cardiac Cath Lab Recovery Arrival Note:      [facility] arrived to Cardiac Cath Lab, Recovery Area. Staff introduced to patient. Patient identifiers verified with NAME and DATE OF BIRTH. Procedure verified with patient. Consent forms reviewed and signed by patient or authorized representative and verified. Allergies verified. Patient and family oriented to department. Patient and family informed of procedure and plan of care. Questions answered with review. Patient prepped for procedure, per orders from physician, prior to arrival.    Patient on cardiac monitor, non-invasive blood pressure, SPO2 monitor. On room air. Patient is A&Ox 4. Patient reports no complaints. Patient in stretcher, in low position, with side rails up, call bell within reach, patient instructed to call if assistance as needed. Patient prep in: [address], [place] [address]. Family in: waiting room. Prep by: Arielle Flores RN.

## 2018-02-01 NOTE — DISCHARGE INSTRUCTIONS
[address] [place] [address]  [phone]        Patient ID:  Robert Merlos  [id number]  16 y.o.  1955    Admit Date: 2/1/2018    Discharge Date: 2/1/2018     Admitting Physician: Angelique Pagan MD     Discharge Physician: Brandon Buckley NP    Admission Diagnoses:   Angina, class III [facility]) [I20.9]    Discharge Diagnoses: Active Problems:    Angina, class III ([address] (2/1/2018)        Discharge Condition: Good    Cardiology Procedures this Admission:  Diagnostic left heart catheterization    Disposition: home    Reference discharge instructions provided by nursing for diet and activity. Signed:  Brandon Buckley NP  2/1/2018  1:51 PM      Radial Cardiac Catheterization/Angiography Discharge Instructions    It is normal to feel tired the first couple days. Take it easy and follow the physicians instructions. CHECK THE CATHETER INSERTION SITE DAILY:    Remove the wrist dressing 24 hours after the procedure. You may shower 24 hours after the procedure. Wash with soap and water and pat dry. Gentle cleaning of the site with soap and water is sufficient, cover with a dry clean dressing or bandage. Do not apply creams or powders to the area. No soaking the wrist for 3 days. Leave the puncture site open to air after 24 hours post-procedure. CALL THE PHYSICIANS:     If the site becomes red, swollen or feels warm to the touch  If there is bleeding or drainage or if there is unusual pain at the radial site. If there is any minor oozing, you may apply a band-aid and remove after 12 hours. If the bleeding continues, hold pressure with the middle finger against the puncture site and the thumb against the back of the wrist,call 911 to be transported to the hospital.  DO NOT DRIVE YOURSELF, [facility].     ACTIVITY:   For the first 24 hours do not manipulate the wrist.  No lifting, pushing or pulling over 3-5 pounds with the affected wrist for 7 daysand no straining the insertion site. Do not life grocery bags or the garbage can, do not run the vacuum  or  for 7 days. Start with short walks as in the hospital and gradually increase as tolerated each day. It is recommended to walk 30 minutes 5-7 days per week. Follow your physicians instructions on activity. Avoid walking outside in extremes of heat or cold. Walk inside when it is cold and windy or hot and humid. Things to keep in mind:  No driving for at least 24 hours, or as designated by your physician. Limit the number of times you go up and down the stairs  Take rests and pace yourself with activity. Be careful and do not strain with bowel movements. MEDICATIONS:    Take all medications as prescribed  Call your physician if you have any questions  Keep an updated list of your medications with you at all times and give a list to your physician and pharmacist    SIGNS AND SYMPTOMS:   Be cautious of symptoms of angina or recurrent symptoms such as chest discomfort, unusual shortness of breath or fatigue. These could be symptoms of restenosis, a new blockage or a heart attack. If your symptoms are relieved with rest it is still recommended that you notify your physician of recurrent chest pain or discomfort. For CHEST PAIN or symptoms of angina not relieved with rest:  If the discomfort is not relieved with rest, and you have been prescribed Nitroglycerin, take as directed (taken under the tongue, one at a time 5 minutes apart for a total of 3 doses). If the discomfort is not relieved after the 3rd nitroglycerin, call 911. If you have not been prescribed Nitroglycerin  and your chest discomfort is not relieved with rest, call 911. AFTER CARE:   Follow up with your physician as instructed.   Follow a heart healthy diet with proper portion control, daily stress management, daily exercise, blood pressure and cholesterol control , and smoking cessation.

## 2018-02-01 NOTE — IP AVS SNAPSHOT
[address] 
[phone] Patient: Linda Jurado MRN: [id number] About your hospitalization You were admitted on:  February 1, 2018 [facility]  [facility] You were discharged on:  February 1, 2018 Why you were hospitalized Your primary diagnosis was:  Not on File Your diagnoses also included:  Angina, Class Iii (Hcc) Follow-up Information Follow up With Details Comments Contact Info Ab Apodaca NP   [address] [facility] [address] [phone] Compa Amador MD Schedule an appointment as soon as possible for a visit in 2 weeks  [address] 
[phone] Discharge Orders None A check yogesh indicates which time of day the medication should be taken. My Medications CHANGE how you take these medications Instructions Each Dose to Equal  
 Morning Noon Evening Bedtime  
 metFORMIN 1,000 mg tablet Commonly known as:  GLUCOPHAGE What changed:  additional instructions Your last dose was: Your next dose is: Take 1 Tab by mouth two (2) times daily (with meals). Please restart metformin on Saturday 2/3/18  
 1000 mg CONTINUE taking these medications Instructions Each Dose to Equal  
 Morning Noon Evening Bedtime  
 allopurinol 100 mg tablet Commonly known as:  [place] Lou Your last dose was: Your next dose is:    
   
   
 take 1 tablet by mouth twice a day  
     
   
   
   
  
 aspirin 81 mg chewable tablet Your last dose was: Your next dose is: Take 81 mg by mouth daily. 81 mg  
    
   
   
   
  
 atorvastatin 40 mg tablet Commonly known as:  LIPITOR Your last dose was: Your next dose is: Take 40 mg by mouth daily.   
 40 mg  
    
 furosemide 40 mg tablet Commonly known as:  LASIX Your last dose was: Your next dose is: Take 40 mg by mouth daily. 40 mg  
    
   
   
   
  
 glipiZIDE 5 mg tablet Commonly known as:  Catherene Dage Your last dose was: Your next dose is:    
   
   
      
   
   
   
  
 HYDROcodone-acetaminophen 5-325 mg per tablet Commonly known as:  Uriel Orange Your last dose was: Your next dose is: Take 1 Tab by mouth every four (4) hours as needed for Pain. Max Daily Amount: 6 Tabs. 1 Tab  
    
   
   
   
  
 lisinopril 20 mg tablet Commonly known as:  Maria Del Carmenron Alex Your last dose was: Your next dose is: Take 20 mg by mouth daily. 20 mg  
    
   
   
   
  
 metoprolol succinate 25 mg XL tablet Commonly known as:  TOPROL-XL Your last dose was: Your next dose is: Take 1 Tab by mouth nightly. 25 mg  
    
   
   
   
  
 miconazole 2 % topical powder Commonly known as:  LOTRIMIN AF Your last dose was: Your next dose is:    
   
   
 Apply  to affected area two (2) times a day. mupirocin 2 % ointment Commonly known as:  [facility] Your last dose was: Your next dose is:    
   
   
 Apply  to affected area three (3) times daily. Apply to foot laceration for 5 days * nitroglycerin 0.4 mg SL tablet Commonly known as:  NITROSTAT Your last dose was: Your next dose is: 0.4 mg by SubLINGual route every five (5) minutes as needed for Chest Pain. 0.4 mg  
    
   
   
   
  
 * nitroglycerin 0.4 mg SL tablet Commonly known as:  NITROSTAT Your last dose was: Your next dose is:    
   
   
 1 Tab by SubLINGual route every five (5) minutes as needed for Chest Pain. Do not use with in 24 hours of Viagra use.   
 0.4 mg  
    
   
   
   
  
 oxybutynin 5 mg tablet Commonly known as:  FOHBXNPM Your last dose was: Your next dose is: Take 5 mg by mouth three (3) times daily. 5 mg PLAVIX 75 mg Tab Generic drug:  clopidogrel Your last dose was: Your next dose is: Take 75 mg by mouth daily. 75 mg  
    
   
   
   
  
 potassium chloride SR 10 mEq tablet Commonly known as:  KLOR-CON 10 Your last dose was: Your next dose is: Take 20 mEq by mouth two (2) times a day. 20 mEq ROBAXIN-750 750 mg tablet Generic drug:  methocarbamol Your last dose was: Your next dose is: Take 750 mg by mouth three (3) times daily. 750 mg  
    
   
   
   
  
 sildenafil citrate 100 mg tablet Commonly known as:  VIAGRA Your last dose was: Your next dose is: Take 100 mg by mouth as needed. 100 mg  
    
   
   
   
  
 TYLENOL-CODEINE #3 300-30 mg per tablet Generic drug:  acetaminophen-codeine Your last dose was: Your next dose is: Take 1 Tab by mouth every four (4) hours as needed for Pain. 1 Tab * Notice: This list has 2 medication(s) that are the same as other medications prescribed for you. Read the directions carefully, and ask your doctor or other care provider to review them with you. Where to Get Your Medications These medications were sent to [address] [place] [address] Phone:  [phone]  
  metFORMIN 1,000 mg tablet Discharge Instructions [address], [place], [address] [facility]  [phone] Patient ID: 
Regine Jones [id number] 
40 y.o. 
1955 Admit Date: 2/1/2018 Discharge Date: 2/1/2018 Admitting Physician: Mar Lennon MD  
 
Discharge Physician: Vandana Kunz NP Admission Diagnoses:  
Angina, class III ([address] [I20.9] Discharge Diagnoses: Active Problems: 
  Angina, class III ([address] (2/1/2018) Discharge Condition: Good Cardiology Procedures this Admission:  Diagnostic left heart catheterization Disposition: home Reference discharge instructions provided by nursing for diet and activity. Signed: 
Vandana Kunz NP 
2/1/2018 
1:51 PM 
 
 
Radial Cardiac Catheterization/Angiography Discharge Instructions It is normal to feel tired the first couple days. Take it easy and follow the physicians instructions. CHECK THE CATHETER INSERTION SITE DAILY: 
 
Remove the wrist dressing 24 hours after the procedure. You may shower 24 hours after the procedure. Wash with soap and water and pat dry. Gentle cleaning of the site with soap and water is sufficient, cover with a dry clean dressing or bandage. Do not apply creams or powders to the area. No soaking the wrist for 3 days. Leave the puncture site open to air after 24 hours post-procedure. CALL THE PHYSICIANS:  
 
If the site becomes red, swollen or feels warm to the touch If there is bleeding or drainage or if there is unusual pain at the radial site. If there is any minor oozing, you may apply a band-aid and remove after 12 hours. If the bleeding continues, hold pressure with the middle finger against the puncture site and the thumb against the back of the wrist,call 911 to be transported to the hospital. 
DO NOT DRIVE YOURSELF, [address] ACTIVITY:  
For the first 24 hours do not manipulate the wrist. 
No lifting, pushing or pulling over 3-5 pounds with the affected wrist for 7 daysand no straining the insertion site. Do not life grocery bags or the garbage can, do not run the vacuum  or  for 7 days. Start with short walks as in the hospital and gradually increase as tolerated each day. It is recommended to walk 30 minutes 5-7 days per week. Follow your physicians instructions on activity. Avoid walking outside in extremes of heat or cold. Walk inside when it is cold and windy or hot and humid. Things to keep in mind: 
No driving for at least 24 hours, or as designated by your physician. Limit the number of times you go up and down the stairs Take rests and pace yourself with activity. Be careful and do not strain with bowel movements. MEDICATIONS: 
 
Take all medications as prescribed Call your physician if you have any questions Keep an updated list of your medications with you at all times and give a list to your physician and pharmacist 
 
SIGNS AND SYMPTOMS:  
Be cautious of symptoms of angina or recurrent symptoms such as chest discomfort, unusual shortness of breath or fatigue. These could be symptoms of restenosis, a new blockage or a heart attack. If your symptoms are relieved with rest it is still recommended that you notify your physician of recurrent chest pain or discomfort. For CHEST PAIN or symptoms of angina not relieved with rest:  If the discomfort is not relieved with rest, and you have been prescribed Nitroglycerin, take as directed (taken under the tongue, one at a time 5 minutes apart for a total of 3 doses). If the discomfort is not relieved after the 3rd nitroglycerin, call 911. If you have not been prescribed Nitroglycerin  and your chest discomfort is not relieved with rest, call 911. AFTER CARE:  
Follow up with your physician as instructed. Follow a heart healthy diet with proper portion control, daily stress management, daily exercise, blood pressure and cholesterol control , and smoking cessation. Introducing [facility]! [facility] introduces [employer] patient portal. Now you can access parts of your medical record, email your doctor's office, and request medication refills online. 1. In your internet browser, go to [email or web address] 2. Click on the First Time User? Click Here link in the Sign In box. You will see the New Member Sign Up page. 3. Enter your [employer] Access Code exactly as it appears below. You will not need to use this code after youve completed the sign-up process. If you do not sign up before the expiration date, you must request a new code. · [employer] Access Code: [email or web address] Expires: 3/4/2018  1:21 PM 
 
4. Enter the last four digits of your Social Security Number (xxxx) and Date of Birth (mm/dd/yyyy) as indicated and click Submit. You will be taken to the next sign-up page. 5. Create a [employer] ID. This will be your [employer] login ID and cannot be changed, so think of one that is secure and easy to remember. 6. Create a [employer] password. You can change your password at any time. 7. Enter your Password Reset Question and Answer. This can be used at a later time if you forget your password. 8. Enter your e-mail address. You will receive e-mail notification when new information is available in [facility] 9. Click Sign Up. You can now view and download portions of your medical record. 10. Click the Download Summary menu link to download a portable copy of your medical information. If you have questions, please visit the Frequently Asked Questions section of the [employer] website. Remember, [employer] is NOT to be used for urgent needs. For medical emergencies, dial 911. Now available from your iPhone and Android! Providers Seen During Your Hospitalization Provider Specialty Primary office phone [address] Kaylee Choudhary MD Cardiology [phone] Your Primary Care Physician (PCP) Primary Care Physician Office Phone Office Fax Hai Barry [phone] You are allergic to the following No active allergies Recent Documentation Height Weight BMI Smoking Status 1.676 m 122.9 kg 43.74 kg/m2 Current Every Day Smoker Emergency Contacts Name Discharge Info Relation Home Work Mobile Jennifer Andrade [1] Brother [24] [phone] [address] CAREGIVER [3] Other Relative [6]   [phone] Patient Belongings The following personal items are in your possession at time of discharge: 
  Dental Appliances: None  Visual Aid: Glasses      Home Medications: Kept at bedside   Jewelry: None  Clothing: Footwear, Jacket/Coat, Pants, Shirt, Undergarments, With patient    Other Valuables: Cell Phone Please provide this summary of care documentation to your next provider. Signatures-by signing, you are acknowledging that this After Visit Summary has been reviewed with you and you have received a copy. Patient Signature:  ____________________________________________________________ Date:  ____________________________________________________________  
  
Rob Hudson Provider Signature:  ____________________________________________________________ Date:  ____________________________________________________________

## 2018-02-01 NOTE — PROGRESS NOTES
Pt received discharge instructions and prescriptions. Pt states understanding of follow-up care and   side effects of medications. Pt given opportunity for questions and clarifications. IV removed. Pt has all belongings.  Chi Lopez RN

## 2018-02-01 NOTE — IP AVS SNAPSHOT
[address] [facility] 
[phone] Patient: Ena Saldana MRN: [id number] A check yogesh indicates which time of day the medication should be taken. My Medications CHANGE how you take these medications Instructions Each Dose to Equal  
 Morning Noon Evening Bedtime  
 metFORMIN 1,000 mg tablet Commonly known as:  GLUCOPHAGE What changed:  additional instructions Your last dose was: Your next dose is: Take 1 Tab by mouth two (2) times daily (with meals). Please restart metformin on Saturday 2/3/18  
 1000 mg CONTINUE taking these medications Instructions Each Dose to Equal  
 Morning Noon Evening Bedtime  
 allopurinol 100 mg tablet Commonly known as:  Wandy Gallus Your last dose was: Your next dose is:    
   
   
 take 1 tablet by mouth twice a day  
     
   
   
   
  
 aspirin 81 mg chewable tablet Your last dose was: Your next dose is: Take 81 mg by mouth daily. 81 mg  
    
   
   
   
  
 atorvastatin 40 mg tablet Commonly known as:  LIPITOR Your last dose was: Your next dose is: Take 40 mg by mouth daily. 40 mg  
    
   
   
   
  
 furosemide 40 mg tablet Commonly known as:  LASIX Your last dose was: Your next dose is: Take 40 mg by mouth daily. 40 mg  
    
   
   
   
  
 glipiZIDE 5 mg tablet Commonly known as:  Meghna Kingdom Your last dose was: Your next dose is:    
   
   
      
   
   
   
  
 HYDROcodone-acetaminophen 5-325 mg per tablet Commonly known as:  Justo Darmanuel Your last dose was: Your next dose is: Take 1 Tab by mouth every four (4) hours as needed for Pain. Max Daily Amount: 6 Tabs. 1 Tab  
    
   
   
   
  
 lisinopril 20 mg tablet Commonly known as:  Delores Ascencio Your last dose was: Your next dose is: Take 20 mg by mouth daily. 20 mg  
    
   
   
   
  
 metoprolol succinate 25 mg XL tablet Commonly known as:  TOPROL-XL Your last dose was: Your next dose is: Take 1 Tab by mouth nightly. 25 mg  
    
   
   
   
  
 miconazole 2 % topical powder Commonly known as:  LOTRIMIN AF Your last dose was: Your next dose is:    
   
   
 Apply  to affected area two (2) times a day. mupirocin 2 % ointment Commonly known as:  [employer] Your last dose was: Your next dose is:    
   
   
 Apply  to affected area three (3) times daily. Apply to foot laceration for 5 days * nitroglycerin 0.4 mg SL tablet Commonly known as:  NITROSTAT Your last dose was: Your next dose is: 0.4 mg by SubLINGual route every five (5) minutes as needed for Chest Pain. 0.4 mg  
    
   
   
   
  
 * nitroglycerin 0.4 mg SL tablet Commonly known as:  NITROSTAT Your last dose was: Your next dose is:    
   
   
 1 Tab by SubLINGual route every five (5) minutes as needed for Chest Pain. Do not use with in 24 hours of Viagra use. 0.4 mg  
    
   
   
   
  
 oxybutynin 5 mg tablet Commonly known as:  VGHHDBGB Your last dose was: Your next dose is: Take 5 mg by mouth three (3) times daily. 5 mg PLAVIX 75 mg Tab Generic drug:  clopidogrel Your last dose was: Your next dose is: Take 75 mg by mouth daily. 75 mg  
    
   
   
   
  
 potassium chloride SR 10 mEq tablet Commonly known as:  KLOR-CON 10 Your last dose was: Your next dose is: Take 20 mEq by mouth two (2) times a day. 20 mEq ROBAXIN-750 750 mg tablet Generic drug:  methocarbamol Your last dose was: Your next dose is: Take 750 mg by mouth three (3) times daily. 750 mg  
    
   
   
   
  
 sildenafil citrate 100 mg tablet Commonly known as:  VIAGRA Your last dose was: Your next dose is: Take 100 mg by mouth as needed. 100 mg  
    
   
   
   
  
 TYLENOL-CODEINE #3 300-30 mg per tablet Generic drug:  acetaminophen-codeine Your last dose was: Your next dose is: Take 1 Tab by mouth every four (4) hours as needed for Pain. 1 Tab * Notice: This list has 2 medication(s) that are the same as other medications prescribed for you. Read the directions carefully, and ask your doctor or other care provider to review them with you. Where to Get Your Medications These medications were sent to [address] [place] [address] Drive  [address] [place] [address] Phone:  [phone]  
  metFORMIN 1,000 mg tablet

## 2018-02-01 NOTE — PROCEDURES
LM-normal  LAD-mid 50%   LCX-normal .   RCA-normal.   LV-60%, LVEDP 12. Right radial access. No complications. EBL-minimal.   Specimen-none. Medical management.

## 2018-02-02 NOTE — INTERVAL H&P NOTE
H&P Update:  Demetria Noyola was seen and examined. History and physical has been reviewed. The patient has been examined.  There have been no significant clinical changes since the completion of the originally dated History and Physical.    Signed By: Rigoberto Barragan MD     February 2, 2018 2:57 PM

## 2018-06-16 ENCOUNTER — HOSPITAL ENCOUNTER (EMERGENCY)
Age: 63
Discharge: HOME | End: 2018-06-16
Attending: EMERGENCY MEDICINE | Admitting: EMERGENCY MEDICINE
Payer: MEDICARE

## 2018-06-16 VITALS
HEIGHT: 66 IN | BODY MASS INDEX: 43.39 KG/M2 | TEMPERATURE: 97.7 F | SYSTOLIC BLOOD PRESSURE: 126 MMHG | OXYGEN SATURATION: 96 % | HEART RATE: 59 BPM | WEIGHT: 270 LBS | DIASTOLIC BLOOD PRESSURE: 68 MMHG | RESPIRATION RATE: 18 BRPM

## 2018-06-16 DIAGNOSIS — M10.9 ACUTE GOUT INVOLVING TOE OF RIGHT FOOT, UNSPECIFIED CAUSE: Primary | ICD-10-CM

## 2018-06-16 PROCEDURE — 74011250637 HC RX REV CODE- 250/637: Performed by: EMERGENCY MEDICINE

## 2018-06-16 PROCEDURE — 99283 EMERGENCY DEPT VISIT LOW MDM: CPT

## 2018-06-16 RX ORDER — HYDROCODONE BITARTRATE AND ACETAMINOPHEN 5; 325 MG/1; MG/1
1 TABLET ORAL
Status: COMPLETED | OUTPATIENT
Start: 2018-06-16 | End: 2018-06-16

## 2018-06-16 RX ORDER — HYDROCODONE BITARTRATE AND ACETAMINOPHEN 5; 325 MG/1; MG/1
1 TABLET ORAL
Qty: 10 TAB | Refills: 0 | Status: SHIPPED | OUTPATIENT
Start: 2018-06-16 | End: 2018-06-18

## 2018-06-16 RX ADMIN — HYDROCODONE BITARTRATE AND ACETAMINOPHEN 1 TABLET: 5; 325 TABLET ORAL at 22:21

## 2018-06-17 NOTE — ED PROVIDER NOTES
EMERGENCY DEPARTMENT HISTORY AND PHYSICAL EXAM      Date: 6/16/2018  Patient Name: Cynthia Rico    History of Presenting Illness     Chief Complaint   Patient presents with    Gout       History Provided By: Patient    HPI: Cynthia Rico, 58 y.o. male with PMHx significant for gout, HTN, CAD, DM, COPD, Class III Angina, presents ambulatory to the ED with cc of a gout \"flare up\" in his L big toe with pain radiating through his left leg x 4 days. Pt reports that his pain is exacerbated with movement and states \"I feel like I want to cut it off because of the pain. \" He denies any recent trips/falls. He denies any current medications for gout. He denies a hx of CA. Pt denies any OTC medications for his pain. He specifically denies any numbness, back pain, neck pain, fevers, chills, N/V/D, CP, SOB, dysuria or hematuria. There are no other complaints, changes, or physical findings at this time. PCP: Maryana Llamas NP    Social Hx: - EtOH; + Smoker (1.5 ppd); - Illicit Drugs    Current Outpatient Prescriptions   Medication Sig Dispense Refill    HYDROcodone-acetaminophen (NORCO) 5-325 mg per tablet Take 1 Tab by mouth every four (4) hours as needed for Pain. Max Daily Amount: 6 Tabs. 10 Tab 0    metFORMIN (GLUCOPHAGE) 1,000 mg tablet Take 1 Tab by mouth two (2) times daily (with meals). Please restart metformin on Saturday 2/3/18 60 Tab 11    mupirocin (BACTROBAN) 2 % ointment Apply  to affected area three (3) times daily. Apply to foot laceration for 5 days 22 g 0    miconazole (LOTRIMIN AF) 2 % topical powder Apply  to affected area two (2) times a day. 85 g 0    allopurinol (ZYLOPRIM) 100 mg tablet take 1 tablet by mouth twice a day  0    glipiZIDE (GLUCOTROL) 5 mg tablet   0    metoprolol succinate (TOPROL-XL) 25 mg XL tablet Take 1 Tab by mouth nightly. 30 Tab 1    nitroglycerin (NITROSTAT) 0.4 mg SL tablet 1 Tab by SubLINGual route every five (5) minutes as needed for Chest Pain.  Do not use with in 24 hours of Viagra use. 1 Bottle 1    HYDROcodone-acetaminophen (NORCO) 5-325 mg per tablet Take 1 Tab by mouth every four (4) hours as needed for Pain. Max Daily Amount: 6 Tabs. 10 Tab 0    clopidogrel (PLAVIX) 75 mg tablet Take 75 mg by mouth daily.  atorvastatin (LIPITOR) 40 mg tablet Take 40 mg by mouth daily.  sildenafil citrate (VIAGRA) 100 mg tablet Take 100 mg by mouth as needed.  methocarbamol (ROBAXIN-750) 750 mg tablet Take 750 mg by mouth three (3) times daily.  oxybutynin (DITROPAN) 5 mg tablet Take 5 mg by mouth three (3) times daily.  furosemide (LASIX) 40 mg tablet Take 40 mg by mouth daily.  acetaminophen-codeine (TYLENOL-CODEINE #3) 300-30 mg per tablet Take 1 Tab by mouth every four (4) hours as needed for Pain.  nitroglycerin (NITROSTAT) 0.4 mg SL tablet 0.4 mg by SubLINGual route every five (5) minutes as needed for Chest Pain.  lisinopril (PRINIVIL, ZESTRIL) 20 mg tablet Take 20 mg by mouth daily.  aspirin 81 mg chewable tablet Take 81 mg by mouth daily.  potassium chloride SR (KLOR-CON 10) 10 mEq tablet Take 20 mEq by mouth two (2) times a day.          Past History     Past Medical History:  Past Medical History:   Diagnosis Date    Angina, class III [facility] [address] 2/1/2018    CAD (coronary artery disease)     Chronic obstructive pulmonary disease [facility] [address]     Diabetes [facility] [address]     Hypertension        Past Surgical History:  Past Surgical History:   Procedure Laterality Date    CARDIAC SURG PROCEDURE UNLIST      cardiac stents x2       Family History:  Family History   Problem Relation Age of Onset    Hypertension Mother     Psychiatric Disorder Mother      bipolar    Elevated Lipids Mother     Seizures Mother        Social History:  Social History   Substance Use Topics    Smoking status: Current Every Day Smoker     Packs/day: 1.50    Smokeless tobacco: Never Used      Comment: \"trying cold turkey\"    Alcohol use No Allergies:  No Known Allergies      Review of Systems   Review of Systems   Constitutional: Negative for chills and fever. HENT: Negative for congestion, rhinorrhea, sneezing and sore throat. Eyes: Negative for redness and visual disturbance. Respiratory: Negative for shortness of breath. Cardiovascular: Negative for chest pain and leg swelling. Gastrointestinal: Negative for abdominal pain, nausea and vomiting. Genitourinary: Negative for difficulty urinating and frequency. Musculoskeletal: Positive for myalgias (L first toe pain radiating through the L leg). Negative for back pain and neck stiffness. Skin: Negative for rash. Neurological: Negative for dizziness, syncope, weakness and headaches. Hematological: Negative for adenopathy. All other systems reviewed and are negative. Physical Exam   Physical Exam   Constitutional: He is oriented to person, place, and time. He appears well-developed and well-nourished. HENT:   Head: Normocephalic and atraumatic. Nose: Nose normal.   Mouth/Throat: Oropharynx is clear and moist.   Eyes: Conjunctivae and EOM are normal. Pupils are equal, round, and reactive to light. Neck: Normal range of motion. Neck supple. Cardiovascular: Normal rate, regular rhythm, normal heart sounds and intact distal pulses. Pulmonary/Chest: Effort normal and breath sounds normal.   Abdominal: Soft. Bowel sounds are normal. He exhibits no distension. Musculoskeletal: Normal range of motion. He exhibits no edema. MTP 1st toe L foot warmth and edema   Neurological: He is alert and oriented to person, place, and time. He exhibits normal muscle tone. Skin: Skin is warm and dry. No erythema. Psychiatric: He has a normal mood and affect. His behavior is normal. Judgment and thought content normal.     Medical Decision Making   I am the first provider for this patient.     I reviewed the vital signs, available nursing notes, past medical history, past surgical history, family history and social history. Vital Signs-Reviewed the patient's vital signs. Patient Vitals for the past 12 hrs:   Temp Pulse Resp BP SpO2   06/16/18 2045 97.7 °F (36.5 °C) (!) 59 18 126/68 96 %       Pulse Oximetry Analysis - 96% on RA    Cardiac Monitor:   Rate: 59 bpm  Rhythm: Sinus Bradycardia     Records Reviewed: Nursing Notes and Old Medical Records    Provider Notes (Medical Decision Making):   DDx: sprain vs gout    ED Course:   Initial assessment performed. The patients presenting problems have been discussed, and they are in agreement with the care plan formulated and outlined with them. I have encouraged them to ask questions as they arise throughout their visit. Critical Care Time: 0 minutes    Disposition:  Discharge Note:  10:26 PM  The pt is ready for discharge. The pt's signs, symptoms, diagnosis, and discharge instructions have been discussed and pt has conveyed their understanding. The pt is to follow up as recommended or return to ER should their symptoms worsen. Plan has been discussed and pt is in agreement. PLAN:  1. Discharge Medication List as of 6/16/2018 10:26 PM      START taking these medications    Details   !! HYDROcodone-acetaminophen (NORCO) 5-325 mg per tablet Take 1 Tab by mouth every four (4) hours as needed for Pain. Max Daily Amount: 6 Tabs., Print, Disp-10 Tab, R-0       !! - Potential duplicate medications found. Please discuss with provider. CONTINUE these medications which have NOT CHANGED    Details   metFORMIN (GLUCOPHAGE) 1,000 mg tablet Take 1 Tab by mouth two (2) times daily (with meals). Please restart metformin on Saturday 2/3/18, Normal, Disp-60 Tab, R-11      mupirocin (BACTROBAN) 2 % ointment Apply  to affected area three (3) times daily.  Apply to foot laceration for 5 days, Normal, Disp-22 g, R-0      miconazole (LOTRIMIN AF) 2 % topical powder Apply  to affected area two (2) times a day., Normal, Disp-85 g, R-0 allopurinol (ZYLOPRIM) 100 mg tablet take 1 tablet by mouth twice a day, Historical Med, R-0      glipiZIDE (GLUCOTROL) 5 mg tablet Historical Med, R-0      metoprolol succinate (TOPROL-XL) 25 mg XL tablet Take 1 Tab by mouth nightly., Normal, Disp-30 Tab, R-1      !! nitroglycerin (NITROSTAT) 0.4 mg SL tablet 1 Tab by SubLINGual route every five (5) minutes as needed for Chest Pain. Do not use with in 24 hours of Viagra use., Normal, Disp-1 Bottle, R-1      !! HYDROcodone-acetaminophen (NORCO) 5-325 mg per tablet Take 1 Tab by mouth every four (4) hours as needed for Pain. Max Daily Amount: 6 Tabs., Print, Disp-10 Tab, R-0      clopidogrel (PLAVIX) 75 mg tablet Take 75 mg by mouth daily. , Historical Med      atorvastatin (LIPITOR) 40 mg tablet Take 40 mg by mouth daily. , Historical Med      sildenafil citrate (VIAGRA) 100 mg tablet Take 100 mg by mouth as needed., Historical Med      methocarbamol (ROBAXIN-750) 750 mg tablet Take 750 mg by mouth three (3) times daily. , Historical Med      oxybutynin (DITROPAN) 5 mg tablet Take 5 mg by mouth three (3) times daily. , Historical Med      furosemide (LASIX) 40 mg tablet Take 40 mg by mouth daily. , Historical Med      acetaminophen-codeine (TYLENOL-CODEINE #3) 300-30 mg per tablet Take 1 Tab by mouth every four (4) hours as needed for Pain., Historical Med      !! nitroglycerin (NITROSTAT) 0.4 mg SL tablet 0.4 mg by SubLINGual route every five (5) minutes as needed for Chest Pain., Historical Med      lisinopril (PRINIVIL, ZESTRIL) 20 mg tablet Take 20 mg by mouth daily. , Historical Med      aspirin 81 mg chewable tablet Take 81 mg by mouth daily. , Historical Med      potassium chloride SR (KLOR-CON 10) 10 mEq tablet Take 20 mEq by mouth two (2) times a day., Historical Med       !! - Potential duplicate medications found. Please discuss with provider.         2.   Follow-up Information     Follow up With Details Comments Contact Info    Nahid Chauhan NP Call  [address] [address]  [phone]      [facility] EMERGENCY DEPT  As needed, If symptoms worsen [address] [facility]  [phone]        Return to ED if worse     Diagnosis     Clinical Impression:   1. Acute gout involving toe of right foot, unspecified cause        Attestations: This note is prepared by Laura Pinedo. Severiano Stevenson, acting as Scribe for  Ángela Chavarria MD.     Ángela Chavarria MD: The scribe's documentation has been prepared under my direction and personally reviewed by me in its entirety. I confirm that the note above accurately reflects all work, treatment, procedures, and medical decision making performed by me.

## 2018-06-17 NOTE — ED NOTES
Discharge instructions were given to the patient by Geovanni Anne RN. The patient left the Emergency Department ambulatory, alert and oriented and in no acute distress with 1 prescription. The patient was encouraged to call or return to the ED for worsening issues or problems and was encouraged to schedule a follow up appointment for continuing care. The patient verbalized understanding of discharge instructions and prescriptions, all questions were answered. The patient has no further concerns at this time.

## 2018-06-17 NOTE — DISCHARGE INSTRUCTIONS
Gout: Care Instructions  Your Care Instructions    Gout is a form of arthritis caused by a buildup of uric acid crystals in a joint. It causes sudden attacks of pain, swelling, redness, and stiffness, usually in one joint, especially the big toe. Gout usually comes on without a cause. But it can be brought on by drinking alcohol (especially beer) or eating seafood and red meat. Taking certain medicines, such as diuretics or aspirin, also can bring on an attack of gout. Taking your medicines as prescribed and following up with your doctor regularly can help you avoid gout attacks in the future. Follow-up care is a key part of your treatment and safety. Be sure to make and go to all appointments, and call your doctor if you are having problems. It's also a good idea to know your test results and keep a list of the medicines you take. How can you care for yourself at home? · If the joint is swollen, put ice or a cold pack on the area for 10 to 20 minutes at a time. Put a thin cloth between the ice and your skin. · Prop up the sore limb on a pillow when you ice it or anytime you sit or lie down during the next 3 days. Try to keep it above the level of your heart. This will help reduce swelling. · Rest sore joints. Avoid activities that put weight or strain on the joints for a few days. Take short rest breaks from your regular activities during the day. · Take your medicines exactly as prescribed. Call your doctor if you think you are having a problem with your medicine. · Take pain medicines exactly as directed. ¨ If the doctor gave you a prescription medicine for pain, take it as prescribed. ¨ If you are not taking a prescription pain medicine, ask your doctor if you can take an over-the-counter medicine. · Eat less seafood and red meat. · Check with your doctor before drinking alcohol. · Losing weight, if you are overweight, may help reduce attacks of gout. But do not go on a [employer]. \" Losing a lot of weight in a short amount of time can cause a gout attack. When should you call for help? Call your doctor now or seek immediate medical care if:  ? · You have a fever. ? · The joint is so painful you cannot use it. ? · You have sudden, unexplained swelling, redness, warmth, or severe pain in one or more joints. ? Watch closely for changes in your health, and be sure to contact your doctor if:  ? · You have joint pain. ? · Your symptoms get worse or are not improving after 2 or 3 days. Where can you learn more? Go to http://daphne-ruddy.info/. Enter V653 in the search box to learn more about \"Gout: Care Instructions. \"  Current as of: October 31, 2016  Content Version: 11.4  © 6044-8196 [employer]. Care instructions adapted under license by [employer] (which disclaims liability or warranty for this information). If you have questions about a medical condition or this instruction, always ask your healthcare professional. [place] [address] any warranty or liability for your use of this information. Purine-Restricted Diet: Care Instructions  Your Care Instructions    Purines are substances that are found in some foods. Your body turns purines into uric acid. High levels of uric acid can cause gout, which is a form of arthritis that causes pain and inflammation in joints. You may be able to help control the amount of uric acid in your body by limiting high-purine foods in your diet. Follow-up care is a key part of your treatment and safety. Be sure to make and go to all appointments, and call your doctor if you are having problems. It's also a good idea to know your test results and keep a list of the medicines you take. How can you care for yourself at home? · Plan your meals and snacks around foods that are low in purines and are safe for you to eat.  These foods include:  ¨ Green vegetables and tomatoes. ¨ Fruits. ¨ Whole-grain breads, rice, and cereals. ¨ Eggs, peanut butter, and nuts. ¨ Low-fat milk, cheese, and other milk products. ¨ Popcorn. ¨ Gelatin desserts, chocolate, cocoa, and cakes and sweets, in small amounts. · You can eat certain foods that are medium-high in purines, but eat them only once in a while. These foods include:  ¨ Legumes, such as dried beans and dried peas. You can have 1 cup cooked legumes each day. ¨ Asparagus, cauliflower, spinach, mushrooms, and green peas. ¨ Fish and seafood (other than very high-purine seafood). ¨ Oatmeal, wheat bran, and wheat germ. · Limit very high-purine foods, including:  ¨ Organ meats, such as liver, kidneys, sweetbreads, and brains. ¨ Meats, including benson, beef, pork, and lamb. ¨ Game meats and any other meats in large amounts. ¨ Anchovies, sardines, herring, mackerel, and scallops. ¨ Gravy. ¨ Beer. Where can you learn more? Go to http://daphne-ruddy.info/. Enter F448 in the search box to learn more about \"Purine-Restricted Diet: Care Instructions. \"  Current as of: May 12, 2017  Content Version: 11.4  © 2047-6647 [employer]. Care instructions adapted under license by [employer] (which disclaims liability or warranty for this information). If you have questions about a medical condition or this instruction, always ask your healthcare professional. [place] [address] any warranty or liability for your use of this information.

## 2018-06-17 NOTE — ED NOTES
Pt presents ambulatory to ED complaining of \"gout flare up\" in left foot. Pt reports pain and swelling began 4 days ago. Pt is alert and oriented x 4, RR even and unlabored, skin is warm and dry. Assesment completed and pt updated on plan of care. Emergency Department Nursing Plan of Care       The Nursing Plan of Care is developed from the Nursing assessment and Emergency Department Attending provider initial evaluation. The plan of care may be reviewed in the ED Provider note.     The Plan of Care was developed with the following considerations:   Patient / Family readiness to learn indicated by:verbalized understanding  Persons(s) to be included in education: patient  Barriers to Learning/Limitations:No    Signed     Eddie Neal RN    6/16/2018   9:29 PM

## 2018-06-18 ENCOUNTER — HOSPITAL ENCOUNTER (OUTPATIENT)
Dept: GENERAL RADIOLOGY | Age: 63
Discharge: HOME | End: 2018-06-18
Payer: MEDICARE

## 2018-06-18 ENCOUNTER — OFFICE VISIT (OUTPATIENT)
Dept: CARDIOLOGY CLINIC | Age: 63
End: 2018-06-18

## 2018-06-18 VITALS
RESPIRATION RATE: 16 BRPM | HEART RATE: 66 BPM | OXYGEN SATURATION: 92 % | BODY MASS INDEX: 44.23 KG/M2 | SYSTOLIC BLOOD PRESSURE: 102 MMHG | DIASTOLIC BLOOD PRESSURE: 66 MMHG | HEIGHT: 66 IN | WEIGHT: 275.2 LBS

## 2018-06-18 DIAGNOSIS — E11.8 CONTROLLED TYPE 2 DIABETES MELLITUS WITH COMPLICATION, WITHOUT LONG-TERM CURRENT USE OF INSULIN (HCC): ICD-10-CM

## 2018-06-18 DIAGNOSIS — E78.2 MIXED HYPERLIPIDEMIA: ICD-10-CM

## 2018-06-18 DIAGNOSIS — I25.10 ASHD (ARTERIOSCLEROTIC HEART DISEASE): Primary | ICD-10-CM

## 2018-06-18 DIAGNOSIS — E66.01 OBESITY, MORBID (HCC): ICD-10-CM

## 2018-06-18 DIAGNOSIS — F17.200 CURRENT SMOKER: ICD-10-CM

## 2018-06-18 DIAGNOSIS — M25.559 HIP PAIN: ICD-10-CM

## 2018-06-18 PROCEDURE — 73521 X-RAY EXAM HIPS BI 2 VIEWS: CPT

## 2018-06-18 RX ORDER — GABAPENTIN 400 MG/1
400 CAPSULE ORAL 3 TIMES DAILY
COMMUNITY
Start: 2018-06-04 | End: 2020-11-24

## 2018-06-18 RX ORDER — GLIPIZIDE 10 MG/1
10 TABLET ORAL DAILY
Refills: 0 | COMMUNITY
Start: 2018-06-07

## 2018-06-18 NOTE — PROGRESS NOTES
Chief Complaint   Patient presents with    Follow-up     pcp requested check-up on stents, bilateral hand swelling      1. Have you been to the ER, urgent care clinic since your last visit? Hospitalized since your last visit? [facility]     2. Have you seen or consulted any other health care providers outside of the [facility] since your last visit? Include any pap smears or colon screening.  No

## 2018-06-18 NOTE — MR AVS SNAPSHOT
[address] [place] [address] 
[phone] Patient: Inderjit Downs MRN: [id number] BLO:[id number]/[id number] Visit Information Date & Time Provider Department Dept. Phone Encounter #  
 6/18/2018 10:30 AM Mayra Hebert MD [address] [facility] [phone] [id number] Upcoming Health Maintenance Date Due Hepatitis C Screening 1955 Pneumococcal 19-64 Medium Risk (1 of 1 - PPSV23) 6/24/1974 FOBT Q 1 YEAR AGE 50-75 6/24/2005 ZOSTER VACCINE AGE 60> 4/24/2015 MEDICARE YEARLY EXAM 3/14/2018 Influenza Age 5 to Adult 8/1/2018 DTaP/Tdap/Td series (2 - Td) 12/8/2027 Allergies as of 6/18/2018  Review Complete On: 6/18/2018 By: Jorge Luis Simmons No Known Allergies Current Immunizations  Never Reviewed Name Date Tdap 12/8/2017  7:16 PM  
  
 Not reviewed this visit You Were Diagnosed With   
  
 Codes Comments ASHD (arteriosclerotic heart disease)    -  Primary ICD-10-CM: I25.10 ICD-9-CM: 414.00 Mixed hyperlipidemia     ICD-10-CM: E78.2 ICD-9-CM: 272.2 Obesity, morbid ([address]     ICD-10-CM: E66.01 
ICD-9-CM: 278.01 Controlled type 2 diabetes mellitus with complication, without long-term current use of insulin (HCC)     ICD-10-CM: E11.8 ICD-9-CM: 250.90 Current smoker     ICD-10-CM: F17.200 ICD-9-CM: 305.1 Vitals BP Pulse Resp Height(growth percentile) Weight(growth percentile) SpO2  
 102/66 (BP 1 Location: Left arm, BP Patient Position: Sitting) 66 16 5' 6\" (1.676 m) 275 lb 3.2 oz (124.8 kg) 92% BMI Smoking Status 44.42 kg/m2 Current Every Day Smoker Vitals History BMI and BSA Data Body Mass Index Body Surface Area 44.42 kg/m 2 2.41 m 2 Preferred Pharmacy Pharmacy Name Phone [phone] [phone] Your Updated Medication List  
  
   
 This list is accurate as of 6/18/18 11:04 AM.  Always use your most recent med list.  
  
  
  
  
 allopurinol 100 mg tablet Commonly known as:  ZYLOPRIM  
take 1 tablet by mouth twice a day  
  
 aspirin 81 mg chewable tablet Take 81 mg by mouth daily. atorvastatin 40 mg tablet Commonly known as:  LIPITOR Take 40 mg by mouth daily. furosemide 40 mg tablet Commonly known as:  LASIX Take 40 mg by mouth daily. gabapentin 400 mg capsule Commonly known as:  NEURONTIN Take 400 mg by mouth three (3) times daily. glipiZIDE 10 mg tablet Commonly known as:  R[employer]on [employer] Take 10 mg by mouth daily. lisinopril 20 mg tablet Commonly known as:  Alexsander [place] Take 20 mg by mouth daily. metFORMIN 1,000 mg tablet Commonly known as:  GLUCOPHAGE Take 1 Tab by mouth two (2) times daily (with meals). Please restart metformin on Saturday 2/3/18  
  
 metoprolol succinate 25 mg XL tablet Commonly known as:  TOPROL-XL Take 1 Tab by mouth nightly. miconazole 2 % topical powder Commonly known as:  LOTRIMIN AF Apply  to affected area two (2) times a day. mupirocin 2 % ointment Commonly known as:  [employer] Apply  to affected area three (3) times daily. Apply to foot laceration for 5 days  
  
 nitroglycerin 0.4 mg SL tablet Commonly known as:  NITROSTAT  
1 Tab by SubLINGual route every five (5) minutes as needed for Chest Pain. Do not use with in 24 hours of Viagra use. potassium chloride SR 10 mEq tablet Commonly known as:  KLOR-CON 10 Take 20 mEq by mouth two (2) times a day. sildenafil citrate 100 mg tablet Commonly known as:  VIAGRA Take 100 mg by mouth as needed. TYLENOL-CODEINE #3 300-30 mg per tablet Generic drug:  acetaminophen-codeine Take 1 Tab by mouth every four (4) hours as needed for Pain. We Performed the Following AMB POC EKG ROUTINE W/ 12 LEADS, INTER & REP [47661 CPT(R)] Introducing [facility]! [facility] introduces [employer] patient portal. Now you can access parts of your medical record, email your doctor's office, and request medication refills online. 1. In your internet browser, go to [email or web address] 2. Click on the First Time User? Click Here link in the Sign In box. You will see the New Member Sign Up page. 3. Enter your [employer] Access Code exactly as it appears below. You will not need to use this code after youve completed the sign-up process. If you do not sign up before the expiration date, you must request a new code. · [employer] Access Code: [email or web address] Expires: 9/14/2018  8:45 PM 
 
4. Enter the last four digits of your Social Security Number (xxxx) and Date of Birth (mm/dd/yyyy) as indicated and click Submit. You will be taken to the next sign-up page. 5. Create a [employer] ID. This will be your [employer] login ID and cannot be changed, so think of one that is secure and easy to remember. 6. Create a [employer] password. You can change your password at any time. 7. Enter your Password Reset Question and Answer. This can be used at a later time if you forget your password. 8. Enter your e-mail address. You will receive e-mail notification when new information is available in [address] 9. Click Sign Up. You can now view and download portions of your medical record. 10. Click the Download Summary menu link to download a portable copy of your medical information. If you have questions, please visit the Frequently Asked Questions section of the [employer] website. Remember, [employer] is NOT to be used for urgent needs. For medical emergencies, dial 911. Now available from your iPhone and Android! Please provide this summary of care documentation to your next provider. Your primary care clinician is listed as Ashish Cornell. If you have any questions after today's visit, please call [phone].

## 2024-12-06 NOTE — H&P (VIEW-ONLY)
NAME:  Geoff Michele   :   1955   MRN:   [id number]   PCP:  Opal Arreguin NP           Subjective: The patient is a 58y.o. year old male  who returns for a routine follow-up. Since the last visit, patient reports no change in exercise tolerance,  edema, medication intolerance, palpitations, shortness of breath, PND/orthopnea wheezing, sputum, syncope, dizziness or light headedness. Continues to have chest pain. Past Medical History:   Diagnosis Date    CAD (coronary artery disease)     Diabetes ([facility] [address]     Hypertension         ICD-10-CM ICD-9-CM    1. Angina, class III (HCC) I20.9 413.9 CBC W/O DIFF      METABOLIC PANEL, BASIC      PROTHROMBIN TIME + INR      CARDIAC CATHETERIZATION   2. Abnormal stress echo R94.39 794.39 CANCELED: AMB POC EKG ROUTINE W/ 12 LEADS, INTER & REP   3. Abnormal stress test R94.39 794.39    4. ASHD (arteriosclerotic heart disease) I25.10 414.00       Social History   Substance Use Topics    Smoking status: Current Every Day Smoker     Packs/day: 1.50    Smokeless tobacco: Never Used    Alcohol use No      No family history on file. Review of Systems  General: Pt denies excessive weight gain or loss. Pt is able to conduct ADL's  HEENT: Denies blurred vision, headaches, epistaxis and difficulty swallowing. Respiratory: Denies shortness of breath, HEDRICK, wheezing or stridor. Cardiovascular: Denies precordial pain, palpitations, edema or PND  Gastrointestinal: Denies poor appetite, indigestion, abdominal pain or blood in stool  Musculoskeletal: Denies pain or swelling from muscles or joints  Neurologic: Denies tremor, paresthesias, or sensory motor disturbance  Skin: Denies rash, itching or texture change.     Objective:       Vitals:    18 1341 18 1350   BP: 120/60 122/60   Pulse: 72    Resp: 20    SpO2: 92%    Weight: 272 lb 9.6 oz (123.7 kg)    Height: 5' 6\" (1.676 m)     Body mass index is 44 kg/(m^2). General PE  Mental Status - Alert. General Appearance - Not in acute distress. Chest and Lung Exam   Inspection: Accessory muscles - No use of accessory muscles in breathing. Auscultation:   Breath sounds: - Normal.    Cardiovascular   Inspection: Jugular vein - Bilateral - Inspection Normal.  Palpation/Percussion:   Apical Impulse: - Normal.  Auscultation: Rhythm - Regular. Heart Sounds - S1 WNL and S2 WNL. No S3 or S4. Murmurs & Other Heart Sounds: Auscultation of the heart reveals - No Murmurs. Peripheral Vascular   Upper Extremity: Inspection - Bilateral - No Cyanotic nailbeds or Digital clubbing. Lower Extremity:   Palpation: Edema - Bilateral - No edema. Data Review:     EKG -EKG: normal EKG, normal sinus rhythm, unchanged from previous tracings. Medications reviewed  Current Outpatient Prescriptions   Medication Sig    mupirocin (BACTROBAN) 2 % ointment Apply  to affected area three (3) times daily. Apply to foot laceration for 5 days    miconazole (LOTRIMIN AF) 2 % topical powder Apply  to affected area two (2) times a day.  glipiZIDE (GLUCOTROL) 5 mg tablet     nitroglycerin (NITROSTAT) 0.4 mg SL tablet 1 Tab by SubLINGual route every five (5) minutes as needed for Chest Pain. Do not use with in 24 hours of Viagra use.  clopidogrel (PLAVIX) 75 mg tablet Take 75 mg by mouth daily.  atorvastatin (LIPITOR) 40 mg tablet Take 40 mg by mouth daily.  sildenafil citrate (VIAGRA) 100 mg tablet Take 100 mg by mouth as needed.  metFORMIN (GLUCOPHAGE) 1,000 mg tablet Take 1,000 mg by mouth two (2) times daily (with meals).  furosemide (LASIX) 40 mg tablet Take 40 mg by mouth daily.  nitroglycerin (NITROSTAT) 0.4 mg SL tablet 0.4 mg by SubLINGual route every five (5) minutes as needed for Chest Pain.  lisinopril (PRINIVIL, ZESTRIL) 20 mg tablet Take 20 mg by mouth daily.  aspirin 81 mg chewable tablet Take 81 mg by mouth daily.     potassium chloride SR (KLOR-CON 10) 10 mEq tablet Take 20 mEq by mouth two (2) times a day.  allopurinol (ZYLOPRIM) 100 mg tablet take 1 tablet by mouth twice a day    metoprolol succinate (TOPROL-XL) 25 mg XL tablet Take 1 Tab by mouth nightly.  HYDROcodone-acetaminophen (NORCO) 5-325 mg per tablet Take 1 Tab by mouth every four (4) hours as needed for Pain. Max Daily Amount: 6 Tabs.  methocarbamol (ROBAXIN-750) 750 mg tablet Take 750 mg by mouth three (3) times daily.  oxybutynin (DITROPAN) 5 mg tablet Take 5 mg by mouth three (3) times daily.  acetaminophen-codeine (TYLENOL-CODEINE #3) 300-30 mg per tablet Take 1 Tab by mouth every four (4) hours as needed for Pain. No current facility-administered medications for this visit. Assessment:       ICD-10-CM ICD-9-CM    1. Angina, class III (HCC) I20.9 413.9 CBC W/O DIFF      METABOLIC PANEL, BASIC      PROTHROMBIN TIME + INR      CARDIAC CATHETERIZATION   2. Abnormal stress echo R94.39 794.39 CANCELED: AMB POC EKG ROUTINE W/ 12 LEADS, INTER & REP   3. Abnormal stress test R94.39 794.39    4. ASHD (arteriosclerotic heart disease) I25.10 414.00         Plan:     Patient presents with continued chest pain. Has known CAD. Stress test shows significant ischemia. Discussed cath/PCI. Benefits, risks, alternatives discussed with patient and wife.      Compa Amador MD Unable to assess